# Patient Record
Sex: FEMALE | Race: WHITE
[De-identification: names, ages, dates, MRNs, and addresses within clinical notes are randomized per-mention and may not be internally consistent; named-entity substitution may affect disease eponyms.]

---

## 2017-01-17 ENCOUNTER — HOSPITAL ENCOUNTER (OUTPATIENT)
Dept: HOSPITAL 45 - C.LABSPEC | Age: 67
Discharge: HOME | End: 2017-01-17
Attending: NURSE PRACTITIONER
Payer: COMMERCIAL

## 2017-01-17 DIAGNOSIS — N20.1: Primary | ICD-10-CM

## 2017-01-23 ENCOUNTER — HOSPITAL ENCOUNTER (OUTPATIENT)
Dept: HOSPITAL 45 - C.RAD | Age: 67
Discharge: HOME | End: 2017-01-23
Attending: NURSE PRACTITIONER
Payer: COMMERCIAL

## 2017-01-23 DIAGNOSIS — N20.0: ICD-10-CM

## 2017-01-23 DIAGNOSIS — N20.1: Primary | ICD-10-CM

## 2017-01-23 NOTE — DIAGNOSTIC IMAGING REPORT
KUB



HISTORY:      Right-sided ureteral stone. Follow-up.



COMPARISON: [facility]UB 1/16/2017.



FINDINGS: The bowel gas pattern is unremarkable. There are no dilated loops of

small bowel to suggest an obstruction.  Bilateral renal calculi are again noted.

Dominant stone within the upper pole of the left kidney measures 13 mm. Multiple

round calcifications within the deep pelvis are consistent with phleboliths. The

5 mm stone within the proximal right ureter seen on the prior study now resides

within the distal right ureter adjacent to the lower sacrum. No left ureteral

calculi. No pneumoperitoneum or pneumatosis.



IMPRESSION:  

Interval migration of the 5 mm right ureteral stone which now resides within the

distal right ureter. Bilateral nephrolithiasis persist.







Electronically signed by:  Nilson Seth M.D.

1/23/2017 1:01 PM



Dictated Date/Time:  1/23/2017 1:00 PM

## 2017-01-24 ENCOUNTER — HOSPITAL ENCOUNTER (OUTPATIENT)
Dept: HOSPITAL 45 - C.LAB | Age: 67
Discharge: HOME | End: 2017-01-24
Attending: UROLOGY
Payer: COMMERCIAL

## 2017-01-24 DIAGNOSIS — N20.0: Primary | ICD-10-CM

## 2017-01-24 LAB
ANION GAP SERPL CALC-SCNC: 13 MMOL/L (ref 3–11)
BASOPHILS # BLD: 0.03 K/UL (ref 0–0.2)
BASOPHILS NFR BLD: 0.3 %
BUN SERPL-MCNC: 11 MG/DL (ref 7–18)
BUN/CREAT SERPL: 12.5 (ref 10–20)
CALCIUM SERPL-MCNC: 9 MG/DL (ref 8.5–10.1)
CHLORIDE SERPL-SCNC: 102 MMOL/L (ref 98–107)
CO2 SERPL-SCNC: 28 MMOL/L (ref 21–32)
COMPLETE: YES
CREAT SERPL-MCNC: 0.84 MG/DL (ref 0.6–1.2)
EOSINOPHIL NFR BLD AUTO: 203 K/UL (ref 130–400)
GLUCOSE SERPL-MCNC: 82 MG/DL (ref 70–99)
HCT VFR BLD CALC: 37.9 % (ref 37–47)
IG%: 0.2 %
IMM GRANULOCYTES NFR BLD AUTO: 36.4 %
LYMPHOCYTES # BLD: 3.5 K/UL (ref 1.2–3.4)
MCH RBC QN AUTO: 30.2 PG (ref 25–34)
MCHC RBC AUTO-ENTMCNC: 33.5 G/DL (ref 32–36)
MCV RBC AUTO: 90.2 FL (ref 80–100)
MONOCYTES NFR BLD: 7.6 %
NEUTROPHILS # BLD AUTO: 1.5 %
NEUTROPHILS NFR BLD AUTO: 54 %
PMV BLD AUTO: 11.6 FL (ref 7.4–10.4)
POTASSIUM SERPL-SCNC: 4 MMOL/L (ref 3.5–5.1)
RBC # BLD AUTO: 4.2 M/UL (ref 4.2–5.4)
SODIUM SERPL-SCNC: 143 MMOL/L (ref 136–145)
WBC # BLD AUTO: 9.62 K/UL (ref 4.8–10.8)

## 2017-01-26 ENCOUNTER — HOSPITAL ENCOUNTER (OUTPATIENT)
Dept: HOSPITAL 45 - C.ACU | Age: 67
Discharge: HOME | End: 2017-01-26
Attending: UROLOGY
Payer: COMMERCIAL

## 2017-01-26 VITALS
OXYGEN SATURATION: 97 % | HEART RATE: 68 BPM | DIASTOLIC BLOOD PRESSURE: 68 MMHG | SYSTOLIC BLOOD PRESSURE: 156 MMHG | TEMPERATURE: 98.06 F

## 2017-01-26 VITALS
OXYGEN SATURATION: 98 % | SYSTOLIC BLOOD PRESSURE: 137 MMHG | DIASTOLIC BLOOD PRESSURE: 71 MMHG | HEART RATE: 72 BPM | TEMPERATURE: 97.52 F

## 2017-01-26 VITALS
SYSTOLIC BLOOD PRESSURE: 171 MMHG | OXYGEN SATURATION: 98 % | TEMPERATURE: 98.24 F | HEART RATE: 63 BPM | DIASTOLIC BLOOD PRESSURE: 76 MMHG

## 2017-01-26 VITALS
HEIGHT: 60.98 IN | BODY MASS INDEX: 33.68 KG/M2 | BODY MASS INDEX: 33.68 KG/M2 | BODY MASS INDEX: 33.68 KG/M2 | HEIGHT: 60.98 IN | WEIGHT: 178.38 LBS | WEIGHT: 178.38 LBS

## 2017-01-26 VITALS
SYSTOLIC BLOOD PRESSURE: 128 MMHG | OXYGEN SATURATION: 96 % | HEART RATE: 70 BPM | TEMPERATURE: 97.88 F | DIASTOLIC BLOOD PRESSURE: 69 MMHG

## 2017-01-26 DIAGNOSIS — E11.9: ICD-10-CM

## 2017-01-26 DIAGNOSIS — I65.29: ICD-10-CM

## 2017-01-26 DIAGNOSIS — N39.41: ICD-10-CM

## 2017-01-26 DIAGNOSIS — N02.9: ICD-10-CM

## 2017-01-26 DIAGNOSIS — E78.00: ICD-10-CM

## 2017-01-26 DIAGNOSIS — F41.9: ICD-10-CM

## 2017-01-26 DIAGNOSIS — E55.9: ICD-10-CM

## 2017-01-26 DIAGNOSIS — D12.6: ICD-10-CM

## 2017-01-26 DIAGNOSIS — E03.9: ICD-10-CM

## 2017-01-26 DIAGNOSIS — I10: ICD-10-CM

## 2017-01-26 DIAGNOSIS — M10.9: ICD-10-CM

## 2017-01-26 DIAGNOSIS — N20.1: Primary | ICD-10-CM

## 2017-01-26 DIAGNOSIS — R32: ICD-10-CM

## 2017-01-26 DIAGNOSIS — N39.0: ICD-10-CM

## 2017-01-26 DIAGNOSIS — Z87.448: ICD-10-CM

## 2017-01-26 DIAGNOSIS — M85.80: ICD-10-CM

## 2017-01-26 DIAGNOSIS — E53.8: ICD-10-CM

## 2017-01-26 DIAGNOSIS — R31.9: ICD-10-CM

## 2017-01-26 NOTE — ANESTHESIOLOGY PROGRESS NOTE
Anesthesia Post Op Note


Date & Time


Jan 26, 2017 at 09:38





Vital Signs


Pain Intensity:  2





 Vital Signs Past 12 Hours








  Date Time  Temp Pulse Resp B/P Pulse Ox O2 Delivery O2 Flow Rate FiO2


 


1/26/17 09:34 36.4 72 18 137/71 98 Room Air  


 


1/26/17 09:15 36.6 70 18 128/69 96 Room Air  


 


1/26/17 08:45 36.7 68 18 156/68 97 Room Air  


 


1/26/17 08:40 37.0 64 16 143/69 94 Room Air  





      Mask  


 


1/26/17 08:30 37.0 66 16 142/65 92 Room Air  





      Mask  


 


1/26/17 08:20  71 16 156/83 100 Mask 10 


 


1/26/17 08:10  73 16 147/79 100 Mask 10 


 


1/26/17 08:04 36.2 76 16 179/92 100 Mask 10 


 


1/26/17 05:37 36.8 63 18 171/76 98 Room Air  











Notes


Mental Status:  alert / awake / arousable, participated in evaluation


Pt Amnestic to Procedure:  Yes


Nausea / Vomiting:  adequately controlled


Pain:  adequately controlled


Airway Patency, RR, SpO2:  stable & adequate


BP & HR:  stable & adequate


Hydration State:  stable & adequate


Anesthetic Complications:  no major complications apparent

## 2017-01-26 NOTE — [facility] POST OPERATIVE BRIEF NOTE
Immediate Operative Summary


Operative Date


Jan 26, 2017.





Pre-Operative Diagnosis





Right ureteral stone





Post-Operative Diagnosis





Same as preoperative diagnosis





Procedure(s) Performed





Right Cystoscopy, Ureteroscopy, Laser Lithotripsy; Stent





Surgeon


Dr Gloria





Assistant Surgeon(s)


none





Estimated Blood Loss


0





Findings


distal right ureteral stone





Specimens





A: Ureteral stone for analysis





Drains


6x24 right stent

## 2017-01-26 NOTE — DIAGNOSTIC IMAGING REPORT
INTRAOPERATIVE RADIOGRAPHS



CLINICAL HISTORY: Nephrolithiasis. Laser lithotripsy and right ureteral stone

extraction. Stent placement.



Fluoroscopy time: 30 seconds.



FINDINGS: 8 spot fluoroscopic views of the right abdomen from a lithotripsy

procedure and stent placement are presented. Initial images show a lithotripsy

device. There is no significant right-sided hydronephrosis seen. The final image

shows the proximal end of a right ureteral stent coiled in the renal pelvis.



IMPRESSION: Intraoperative images from a right-sided laser lithotripsy and

ureteral stent procedure as above. See operative report for detailed findings.







Electronically signed by:  Cornelius Burton M.D.

1/26/2017 9:18 AM



Dictated Date/Time:  1/26/2017 9:16 AM

## 2017-01-26 NOTE — DISCHARGE INSTRUCTIONS
Discharge Instructions


Visit


Reason for Visit:  Stones





Discharge


Discharge Diagnosis / Problem:  ureteral stone





Discharge Goals


Goal(s):  Therapeutic intervention





Activity Recommendations


Activity Limitations:  resume your previous activity (take it easy today)





Anesthesia


.





Post Anesthesia Instructions:





If you have had General Anesthesia or IV Sedation:





*  Do not drive today.


*  Resume driving when surgeon permits.


*  Do not make important decisions or sign legal documents today.


*  Call surgeon for:





   1.  Temperature elevations greater than 101 degrees F.


   2.  Uncontrollable pain.


   3.  Excessive bleeding.


   4.  Persistent nausea and vomiting.


   5.  Medication intolerance (nausea, vomiting or rash).





*  For nausea and vomiting use only clear liquids such as: tea, soda, bouillon 

until nausea subsides, then gradually increase diet as tolerated.





*  If you have any concerns or questions, call your surgeon's office.  If 

physician is unavailable and it is an emergency, call 911 or go to the nearest 

emergency room.





.





Diet Recommendations


Recommended Home Diet:  resume previous diet





Procedures


Procedures Performed:  


Right Cystoscopy, Ureteroscopy, Laser Lithotripsy; Stent





Pending Studies


Studies pending at discharge:  no





Medical Emergencies








.


Who to Call and When:





Medical Emergencies:  If at any time you feel your situation is an emergency, 

please call 911 immediately.





.





Non-Emergent Contact


Non-Emergency issues call your:  Urologist





.


.





"Provider Documentation" section prepared by Bharat Gloria.





[place]


Search Results:  patient reviewed within database

## 2017-01-26 NOTE — OPERATIVE REPORT
DATE OF OPERATION:  01/26/2017

 

PREOPERATIVE DIAGNOSIS:  Distal right ureteral calculus.

 

POSTOPERATIVE DIAGNOSIS:  Same.

 

PROCEDURE:  Cystoscopy, right retrograde pyelogram, right ureteroscopy with

laser lithotripsy and basket extraction of fragments, right ureteral

calculus, placement of indwelling double J right ureteral stent 6-Portuguese x 24

cm.

 

FINDINGS:  Cystoscopic exam revealed normal urethra.  Bladder showed no

mucosal abnormalities.  Ureteroscopy showed a stone in the distal ureter with

proximal hydronephrosis on retrograde.

 

SURGEON:  Dr. Gloria.

 

ANESTHESIA:  General.

 

DRAINS:  6-Portuguese x 24 cm right ureteral stent.

 

COMPLICATIONS:  None.

 

SPECIMENS:  Stone for analysis.

 

INDICATIONS:  The patient is a 66-year-old white female who was seen in the

office yesterday with a distal right ureteral stone.  She has had it for a

week or 2 and has been unable to pass it.  She has  been having significant

pain, so she is being brought in now for removal.

 

OPERATION AND FINDINGS: 

 

PROCEDURE:  After the induction of an adequate general anesthetic and

appropriate time-out, the patient was placed in the dorsal lithotomy

position, lower abdomen and genitalia were prepped with Hibiclens and draped

in a sterile fashion.  Using a 22-Portuguese cystoscope, routine cystoscopic exam

was performed with the above noted findings with the 30 and 70 degree lenses.

 Next, under fluoroscopic guidance 0.038 guidewire was passed up the right

ureter until positioned in the renal pelvis confirmed by fluoroscopy.  Then,

the cystoscope was removed and a rigid ureteroscope was advanced up the right

ureter until the stone was encountered.  Holmium laser was then used to

fragment the stone into multiple small pieces.  Basket was then used to

extract the larger pieces.  After completing the procedure, the ureteroscope

was removed.  A [place] catheter was passed over the guidewire and retrograde

pyelogram was done.  The [place] catheter was removed.  The guidewire was

rethreaded through the cystoscope and a 6-Portuguese x 24 cm stent was passed up

the right ureter until positioned in the renal pelvis confirmed by

fluoroscopy.  The guidewire was removed.  There was good curl at the bladder

level.  The patient's bladder was drained, cystoscope and sheath were

removed.  All needle, sponge and instrument counts were correct at the end of

the case.  The patient tolerated the procedure well and went to the recovery

room in stable condition.

 

 

I attest to the content of the Intraoperative Record and any orders documented therein. Any exceptio
ns are noted below.

## 2017-02-06 ENCOUNTER — HOSPITAL ENCOUNTER (OUTPATIENT)
Dept: HOSPITAL 45 - C.RAD | Age: 67
Discharge: HOME | End: 2017-02-06
Attending: UROLOGY
Payer: COMMERCIAL

## 2017-02-06 DIAGNOSIS — N20.0: Primary | ICD-10-CM

## 2017-02-06 NOTE — DIAGNOSTIC IMAGING REPORT
KUB



CLINICAL HISTORY: N20.0 Nephrolithiasis nephrocalcinosis



COMPARISON STUDY:  1/23/2017 



FINDINGS: Interval placement of a right ureteral stent. Bilateral

nephrocalcinosis is unchanged. The distal right ureteral calculus is now well

seen currently. Multiple pelvic vascular calcifications are present.



IMPRESSION:  Interval placement of a right ureteral stent.





2. The distal right ureteral calculus is not seen currently.





3. Stable bilateral nephrocalcinosis 







Electronically signed by:  Alexis Nieto M.D.

2/6/2017 12:29 PM



Dictated Date/Time:  2/6/2017 12:28 PM

## 2017-02-16 NOTE — DIAGNOSTIC IMAGING REPORT
KUB



HISTORY:      N20.0 Nephrolithiasis BE DONE EITHER THE NIGHT BEFORE OR MORNING



COMPARISON: KUB 2/6/2017.



FINDINGS: The bowel gas pattern is unremarkable. There are no dilated loops of

small bowel to suggest an obstruction.  The right ureteral stent is been

removed. Stable bilateral nephrolithiasis. Dominant stone within the upper pole

the left kidney measures 12 mm. No ureteral calculi. Calcifications in the deep

pelvis likely represent phleboliths. No pneumoperitoneum or pneumatosis.



IMPRESSION:  



1. Interval removal of the right ureteral stent. No ureteral calculi identified.

2. Stable bilateral nephrolithiasis.







Electronically signed by:  Nilson Seth M.D.

2/16/2017 11:49 AM



Dictated Date/Time:  2/16/2017 11:47 AM

## 2017-02-17 ENCOUNTER — HOSPITAL ENCOUNTER (OUTPATIENT)
Dept: HOSPITAL 45 - X.SURG | Age: 67
Discharge: HOME | End: 2017-02-17
Attending: UROLOGY
Payer: COMMERCIAL

## 2017-02-17 VITALS
WEIGHT: 178.38 LBS | BODY MASS INDEX: 33.68 KG/M2 | HEIGHT: 60.98 IN | BODY MASS INDEX: 33.68 KG/M2 | WEIGHT: 178.38 LBS | HEIGHT: 60.98 IN

## 2017-02-17 VITALS — OXYGEN SATURATION: 96 % | DIASTOLIC BLOOD PRESSURE: 76 MMHG | HEART RATE: 54 BPM | SYSTOLIC BLOOD PRESSURE: 134 MMHG

## 2017-02-17 DIAGNOSIS — E55.9: ICD-10-CM

## 2017-02-17 DIAGNOSIS — E11.9: ICD-10-CM

## 2017-02-17 DIAGNOSIS — N39.41: ICD-10-CM

## 2017-02-17 DIAGNOSIS — I10: ICD-10-CM

## 2017-02-17 DIAGNOSIS — N20.0: Primary | ICD-10-CM

## 2017-02-17 DIAGNOSIS — E53.8: ICD-10-CM

## 2017-02-17 DIAGNOSIS — M54.5: ICD-10-CM

## 2017-02-17 NOTE — ANESTHESIA PROGRESS NT - [facility]
Anesthesia Post Op Note


Date & Time


Feb 17, 2017 at 11:46





Vital Signs


Pain Intensity:  0





 Vital Signs Past 12 Hours








  Date Time  Temp Pulse Resp B/P Pulse Ox O2 Delivery O2 Flow Rate FiO2


 


2/17/17 11:11 36.4 54 20 146/75 95 Room Air  


 


2/17/17 11:05  55 12     


 


2/17/17 11:05  55 12  95   


 


2/17/17 11:03    131/62    


 


2/17/17 11:00  52 15  96   


 


2/17/17 11:00  51 15     


 


2/17/17 10:59  52 17  96   


 


2/17/17 10:59  52 17     


 


2/17/17 10:58    129/63    


 


2/17/17 10:55 36.6 53 20 143/69 96 Room Air  


 


2/17/17 10:54  53 8  93   


 


2/17/17 10:54  54 8     


 


2/17/17 10:53    143/69    


 


2/17/17 10:50  56 11     


 


2/17/17 10:50  57 11  96   


 


2/17/17 10:49    124/57    


 


2/17/17 10:45  54 15  97   


 


2/17/17 10:45  55 15     


 


2/17/17 10:44  54 12     


 


2/17/17 10:44  54 12  97   


 


2/17/17 10:43    130/67    


 


2/17/17 10:39  58 12  97   


 


2/17/17 10:39  58 12     


 


2/17/17 10:38    134/64    


 


2/17/17 10:34  59 14  98   


 


2/17/17 10:34  59 14     


 


2/17/17 10:33    130/74    


 


2/17/17 10:31  55 12  100   


 


2/17/17 10:31  56 12     


 


2/17/17 10:28    138/77    


 


2/17/17 10:26  58 11     


 


2/17/17 10:26  62 11  99   


 


2/17/17 10:26 37.0 60 22 133/75 99 Mask 8 


 


2/17/17 09:02 36.6 73 16 163/75 97 Room Air  











Notes


Mental Status:  alert / awake / arousable, participated in evaluation


Pt Amnestic to Procedure:  Yes


Nausea / Vomiting:  adequately controlled


Pain:  adequately controlled


Airway Patency, RR, SpO2:  stable & adequate


BP & HR:  stable & adequate


Hydration State:  stable & adequate


Anesthetic Complications:  no major complications apparent

## 2017-02-17 NOTE — [facility] POST OPERATIVE BRIEF NOTE
Immediate Operative Summary


Operative Date


Feb 17, 2017.





Pre-Operative Diagnosis





Right Renal Calculi





Post-Operative Diagnosis





Same





Procedure(s) Performed





Right Renal Extracorporeal Shock Wave Lithotripsy - Renal





Surgeon


Dr. Fu





Assistant Surgeon(s)


None





Estimated Blood Loss


0 mL





Findings


2 r renal stones appeared to fragment





Specimens





None

## 2017-02-17 NOTE — DISCHARGE INSTRUCTIONS-[facility]
Discharge Instructions


Visit


Reason for Visit:  Stones;Nephrolithiasis N20.0





Discharge Goals


Goal(s):  Decrease discomfort, Improve disease control





Medications


Stopped Medications Name(s):  


FISH OIL- STOPPED ON MONDAY 2/13/17





METFORMIN- STOPPED ON SUNDAY 2/12/17





Activity Recommendations


Activity Limitations:  as noted below (no driving on narcotics)





Anesthesia


.





Post Anesthesia Instructions:





If you have had General Anesthesia or IV Sedation:





*  Do not drive today.


*  Resume driving when surgeon permits.


*  Do not make important decisions or sign legal documents today.


*  Call surgeon for:





   1.  Temperature elevations greater than 101 degrees F.


   2.  Uncontrollable pain.


   3.  Excessive bleeding.


   4.  Persistent nausea and vomiting.


   5.  Medication intolerance (nausea, vomiting or rash).





*  For nausea and vomiting use only clear liquids such as: tea, soda, bouillon 

until nausea subsides, then gradually increase diet as tolerated.





*  If you have any concerns or questions, call your surgeon's office.  If 

physician is unavailable and it is an emergency, call 911 or go to the nearest 

emergency room.





.





Diet Recommendations


Home Diet:  resume previous diet





Procedures


Procedures Performed:  


Right Renal Extracorporeal Shock Wave Lithotripsy - Renal





Pending Studies


Studies pending at discharge:  no





Medical Emergencies








.


Who to Call and When:





Medical Emergencies:  If at any time you feel your situation is an emergency, 

please call 911 immediately.





.





Non-Emergent Contact





.


.





"Provider Documentation" section prepared by Javier Fu.

## 2017-03-01 ENCOUNTER — HOSPITAL ENCOUNTER (OUTPATIENT)
Dept: HOSPITAL 45 - C.RAD | Age: 67
Discharge: HOME | End: 2017-03-01
Attending: UROLOGY
Payer: COMMERCIAL

## 2017-03-01 DIAGNOSIS — N20.0: Primary | ICD-10-CM

## 2017-03-01 NOTE — DIAGNOSTIC IMAGING REPORT
KUB



CLINICAL HISTORY: N20.0 Nephrolithiasis    



COMPARISON STUDY:  2/16/2017 



FINDINGS: There is a 14 mm upper pole left renal calculus. Several lower pole

left renal calculi are visualized, the largest of which measures 9 mm. There are

ovoid opacities medial to the upper pole the right kidney. I suspect but am not

certain that these represent pill fragments given their location in relationship

to the kidney. There is a punctate lower pole right renal calculus. There are

multiple nonspecific pelvic basin calcifications likely representing

phleboliths. There is no pathologic bowel dilatation.



IMPRESSION:  Bilateral nephrolithiasis. 







Electronically signed by:  Kleber Alexandre M.D.

3/1/2017 11:31 AM



Dictated Date/Time:  3/1/2017 11:27 AM

## 2017-03-02 ENCOUNTER — HOSPITAL ENCOUNTER (OUTPATIENT)
Dept: HOSPITAL 45 - C.LABSPEC | Age: 67
Discharge: HOME | End: 2017-03-02
Attending: UROLOGY
Payer: COMMERCIAL

## 2017-03-02 DIAGNOSIS — N39.0: Primary | ICD-10-CM

## 2017-04-20 ENCOUNTER — HOSPITAL ENCOUNTER (OUTPATIENT)
Dept: HOSPITAL 45 - C.LAB | Age: 67
Discharge: HOME | End: 2017-04-20
Attending: INTERNAL MEDICINE
Payer: COMMERCIAL

## 2017-04-20 DIAGNOSIS — E11.9: ICD-10-CM

## 2017-04-20 DIAGNOSIS — E83.42: ICD-10-CM

## 2017-04-20 DIAGNOSIS — E78.00: ICD-10-CM

## 2017-04-20 DIAGNOSIS — E03.9: Primary | ICD-10-CM

## 2017-04-20 LAB
ALT SERPL-CCNC: 35 U/L (ref 12–78)
ANION GAP SERPL CALC-SCNC: 5 MMOL/L (ref 3–11)
AST SERPL-CCNC: 18 U/L (ref 15–37)
BUN SERPL-MCNC: 9 MG/DL (ref 7–18)
BUN/CREAT SERPL: 10.3 (ref 10–20)
CALCIUM SERPL-MCNC: 9.5 MG/DL (ref 8.5–10.1)
CHLORIDE SERPL-SCNC: 107 MMOL/L (ref 98–107)
CO2 SERPL-SCNC: 30 MMOL/L (ref 21–32)
CREAT SERPL-MCNC: 0.87 MG/DL (ref 0.6–1.2)
CREAT UR-MCNC: 57 MG/DL
EST. AVERAGE GLUCOSE BLD GHB EST-MCNC: 128 MG/DL
GLUCOSE SERPL-MCNC: 178 MG/DL (ref 70–99)
MAGNESIUM SERPL-MCNC: 2.2 MG/DL (ref 1.8–2.4)
POTASSIUM SERPL-SCNC: 4.8 MMOL/L (ref 3.5–5.1)
RATIO: 121.9 MCG/MG (ref 0–30)
SODIUM SERPL-SCNC: 142 MMOL/L (ref 136–145)
TSH SERPL-ACNC: 1.09 UIU/ML (ref 0.3–4.5)

## 2017-04-25 ENCOUNTER — HOSPITAL ENCOUNTER (OUTPATIENT)
Dept: HOSPITAL 45 - C.ULTR | Age: 67
Discharge: HOME | End: 2017-04-25
Attending: INTERNAL MEDICINE
Payer: COMMERCIAL

## 2017-04-25 DIAGNOSIS — R59.0: Primary | ICD-10-CM

## 2017-04-25 DIAGNOSIS — E04.2: ICD-10-CM

## 2017-04-25 DIAGNOSIS — N02.9: ICD-10-CM

## 2017-04-25 DIAGNOSIS — E78.00: ICD-10-CM

## 2017-04-25 LAB
BASOPHILS # BLD: 0.02 K/UL (ref 0–0.2)
BASOPHILS NFR BLD: 0.3 %
CHOLEST/HDLC SERPL: 2.6 {RATIO}
COMPLETE: YES
EOSINOPHIL NFR BLD AUTO: 253 K/UL (ref 130–400)
GLUCOSE UR QL: 65 MG/DL
HCT VFR BLD CALC: 40 % (ref 37–47)
IG%: 0.1 %
IMM GRANULOCYTES NFR BLD AUTO: 39.1 %
KETONES UR QL STRIP: 73 MG/DL
LYMPHOCYTES # BLD: 2.88 K/UL (ref 1.2–3.4)
MCH RBC QN AUTO: 29.7 PG (ref 25–34)
MCHC RBC AUTO-ENTMCNC: 32.3 G/DL (ref 32–36)
MCV RBC AUTO: 92 FL (ref 80–100)
MONOCYTES NFR BLD: 8.6 %
NEUTROPHILS # BLD AUTO: 2 %
NEUTROPHILS NFR BLD AUTO: 49.9 %
NITRITE UR QL STRIP: 150 MG/DL (ref 0–150)
PH UR: 168 MG/DL (ref 0–200)
PMV BLD AUTO: 11.6 FL (ref 7.4–10.4)
RBC # BLD AUTO: 4.35 M/UL (ref 4.2–5.4)
VERY LOW DENSITY LIPOPROT CALC: 30 MG/DL
WBC # BLD AUTO: 7.36 K/UL (ref 4.8–10.8)

## 2017-04-25 NOTE — DIAGNOSTIC IMAGING REPORT
ULTRASOUND OF THE THYROID GLAND



CLINICAL HISTORY: Cervical lymphadenopathy.



COMPARISON STUDY: No priors.



TECHNIQUE: Real-time, grayscale, and color flow sonography of the thyroid gland

is performed utilizing a high-frequency linear transducer. Images are reviewed

in the transverse and longitudinal planes.



FINDINGS:



Right lobe: The right lobe of the thyroid gland is normal in size and

homogeneous in echotexture, measuring 4.3 x 1.1 x 1.2 cm. A hypoechoic nodule in

the midpole measures 1.6 x 0.4 x 0.6 cm. A hypoechoic nodule in the upper pole

measures 0.4 cm.



Left lobe: The left lobe of the thyroid gland is normal in size and homogeneous

in echotexture, measuring 3.6 x 1.2 x 1.2 cm.



Isthmus: The thyroid isthmus is normal in appearance and measures 0.2 cm in AP

diameter.



Soft tissues: There is a mildly enlarged right cervical lymph node, which

measures 2.1 x 0.7 x 1.4 cm. This node maintains a fatty hilum.





IMPRESSION:  



1. There are small right-sided thyroid nodules as above. Precautionary 6-12

month follow-up examination is recommended for reassessment.



2. There is a mildly enlarged right cervical lymph node. This is of

indeterminant etiology and significance, and this does not appear

morphologically abnormal. This is likely on a reactive basis, and clinical

follow-up to resolution is recommended. If this increases in size then a repeat

examination with fine-needle aspiration should be considered.







Electronically signed by:  Cornelius Burton M.D.

4/25/2017 9:17 AM



Dictated Date/Time:  4/25/2017 9:10 AM

## 2017-05-02 ENCOUNTER — HOSPITAL ENCOUNTER (OUTPATIENT)
Dept: HOSPITAL 45 - C.RAD | Age: 67
Discharge: HOME | End: 2017-05-02
Attending: NURSE PRACTITIONER
Payer: COMMERCIAL

## 2017-05-02 DIAGNOSIS — N20.0: Primary | ICD-10-CM

## 2017-05-02 DIAGNOSIS — R32: ICD-10-CM

## 2017-05-02 NOTE — DIAGNOSTIC IMAGING REPORT
KUB



CLINICAL HISTORY: N20.0 nephrocalcinosis



COMPARISON STUDY:  3/1/2017 



FINDINGS: Unchanging left renal nephrocalcinosis. Calcification appears

described the right paravertebral line are no longer identified. Probable

appendicolith. Multiple pelvic vascular calcifications.



IMPRESSION:  Unchanging left renal nephrocalcinosis.

2. No well-defined right renal calcifications currently 







Electronically signed by:  Alexis Nieto M.D.

5/2/2017 10:58 AM



Dictated Date/Time:  5/2/2017 10:57 AM

## 2017-05-09 ENCOUNTER — HOSPITAL ENCOUNTER (OUTPATIENT)
Dept: HOSPITAL 45 - C.LABSPEC | Age: 67
Discharge: HOME | End: 2017-05-09
Attending: NURSE PRACTITIONER
Payer: COMMERCIAL

## 2017-05-09 DIAGNOSIS — N20.0: Primary | ICD-10-CM

## 2017-05-10 ENCOUNTER — HOSPITAL ENCOUNTER (OUTPATIENT)
Dept: HOSPITAL 45 - C.ACU | Age: 67
Discharge: HOME | End: 2017-05-10
Attending: UROLOGY
Payer: COMMERCIAL

## 2017-05-10 VITALS
SYSTOLIC BLOOD PRESSURE: 148 MMHG | HEART RATE: 60 BPM | TEMPERATURE: 98.42 F | DIASTOLIC BLOOD PRESSURE: 65 MMHG | OXYGEN SATURATION: 96 %

## 2017-05-10 VITALS
TEMPERATURE: 97.88 F | HEART RATE: 71 BPM | OXYGEN SATURATION: 65 % | SYSTOLIC BLOOD PRESSURE: 144 MMHG | DIASTOLIC BLOOD PRESSURE: 61 MMHG

## 2017-05-10 VITALS — DIASTOLIC BLOOD PRESSURE: 62 MMHG | HEART RATE: 60 BPM | SYSTOLIC BLOOD PRESSURE: 161 MMHG | OXYGEN SATURATION: 97 %

## 2017-05-10 VITALS
HEART RATE: 63 BPM | TEMPERATURE: 97.88 F | SYSTOLIC BLOOD PRESSURE: 198 MMHG | OXYGEN SATURATION: 96 % | DIASTOLIC BLOOD PRESSURE: 75 MMHG

## 2017-05-10 VITALS
HEIGHT: 60.98 IN | BODY MASS INDEX: 34.43 KG/M2 | HEIGHT: 60.98 IN | WEIGHT: 182.39 LBS | BODY MASS INDEX: 34.43 KG/M2 | BODY MASS INDEX: 34.43 KG/M2 | WEIGHT: 182.39 LBS

## 2017-05-10 DIAGNOSIS — E11.9: ICD-10-CM

## 2017-05-10 DIAGNOSIS — E66.9: ICD-10-CM

## 2017-05-10 DIAGNOSIS — Z85.820: ICD-10-CM

## 2017-05-10 DIAGNOSIS — F41.9: ICD-10-CM

## 2017-05-10 DIAGNOSIS — F32.9: ICD-10-CM

## 2017-05-10 DIAGNOSIS — Z90.710: ICD-10-CM

## 2017-05-10 DIAGNOSIS — Z98.42: ICD-10-CM

## 2017-05-10 DIAGNOSIS — Z98.41: ICD-10-CM

## 2017-05-10 DIAGNOSIS — N20.0: Primary | ICD-10-CM

## 2017-05-10 DIAGNOSIS — I10: ICD-10-CM

## 2017-05-10 DIAGNOSIS — Z90.89: ICD-10-CM

## 2017-05-10 NOTE — ANESTHESIOLOGY PROGRESS NOTE
Anesthesia Post Op Note


Date & Time


May 10, 2017 at 09:19





Vital Signs


Pain Intensity:  0





 Vital Signs Past 12 Hours








  Date Time  Temp Pulse Resp B/P Pulse Ox O2 Delivery O2 Flow Rate FiO2


 


5/10/17 08:50  60 16 161/62 97 Room Air  


 


5/10/17 08:20 36.9 60 18 148/65 96 Room Air  


 


5/10/17 08:00 37.1 63 21 127/68 94 Room Air  


 


5/10/17 07:50  68 21 129/69 95 Room Air  


 


5/10/17 07:45  64 21 144/70 100 Room Air  


 


5/10/17 07:34 37.2 69 14 120/65 100 Mask 10 


 


5/10/17 05:45 36.6 63 20 198/75 96 Room Air  











Notes


Mental Status:  alert / awake / arousable, participated in evaluation


Pt Amnestic to Procedure:  Yes


Nausea / Vomiting:  adequately controlled


Pain:  adequately controlled


Airway Patency, RR, SpO2:  stable & adequate


BP & HR:  stable & adequate


Hydration State:  stable & adequate


Anesthetic Complications:  no major complications apparent

## 2017-05-10 NOTE — DIAGNOSTIC IMAGING REPORT
FLUOROSCOPIC IMAGES FROM LEFT RETROGRADE EXAM



CLINICAL HISTORY: Left stent placement.    



COMPARISON STUDY:  KUB May 2, 2017.



Fluoroscopy time: 32 seconds.



FINDINGS: 2 fluoroscopic images from left retrograde exam demonstrate

cannulation of the left ureter with placement of a left ureteral stent. The

proximal aspect of the stent is within the renal pelvis. Left renal calculi and

a possible renal pelvis calculus are again noted.



IMPRESSION:  Fluoroscopic images demonstrating placement of a left ureteral

stent. 









Electronically signed by:  Stan Stienberg M.D.

5/10/2017 10:30 AM



Dictated Date/Time:  5/10/2017 10:29 AM

## 2017-05-10 NOTE — [facility] POST OPERATIVE BRIEF NOTE
Immediate Operative Summary


Operative Date


May 10, 2017.





Pre-Operative Diagnosis





Left nephrolithiasis





Post-Operative Diagnosis





Same





Procedure(s) Performed





Cystoscopy, Left Ureteral Stent Insertion, Retrograde pyelogram





Surgeon


Dr ANEUDY Collins





Assistant Surgeon(s)


NA





Estimated Blood Loss


0





Findings


Upper pole stone, good stent position





Specimens





none





Drains


6 fr 24 cm loop stent on left





Anesthesia


MAC





Complication(s)


None





Disposition


Recovery Room / PACU

## 2017-05-10 NOTE — DISCHARGE INSTRUCTIONS
Discharge Instructions


Date of Service


May 10, 2017.





Admission


Reason for Admission:  Stones





Discharge


Discharge Diagnosis / Problem:  L stones s/p stent





Discharge Goals


Goal(s):  Improve disease control, Therapeutic intervention





Activity Recommendations


Activity Limitations:  as noted below


Lifting Limitations:  gradually increase as tolerated


Exercise/Sports Limitations:  rest today, gradually increase as tolerated


May Resume Sexual Activity:  when tolerated


Shower/Bathe:  no limitations


Driving or Machine Use:  resume 1 day after discharge





.





Discharge Diet


Recommended Diet:  Regular Diet (good fluid intake)





Procedures


Procedures Performed:  


Cysto, L RPG, L stent





Pending Studies


Studies pending at discharge:  no





Laboratory Results





 Hemoglobin A1c








Test


  4/20/17


12:03 Range/Units


 


 


Estimated Average Glucose 128   mg/dl


 


Hemoglobin A1c 6.1 H 4.5-5.6  %








 Lipid Panel








Test


  4/25/17


08:40 Range/Units


 


 


Triglycerides Level 150  0-150  mg/dl


 


Cholesterol Level 168  0-200  mg/dl


 


HDL Cholesterol 65   mg/dl


 


Cholesterol/HDL Ratio 2.6   


 


LDL Cholesterol, Calculated 73   mg/dl











Medical Emergencies








.


Who to Call and When:





Medical Emergencies:  If at any time you feel your situation is an emergency, 

please call 911 immediately.





.





Non-Emergent Contact


Non-Emergency issues call your:  Urologist


Call Non-Emergent contact if:  you have a fever, temperature is above 101, your 

pain is not controlled, your pain is worsening, your pain is unusual for you, 

your pain is concerning you, you have any medication questions





.


.





"Provider Documentation" section prepared by Fredo Collins.








.





VTE Core Measure


Inpt VTE Proph given/why not?:  SCD's





[place] Drug [place]


Search Results:  patient reviewed within database, see additional documentation 

(last Rx Feb 2017 by Dr. Fu, regular prior - provided for stent pain and 

upcoming ESWL)

## 2017-05-10 NOTE — OPERATIVE REPORT
PREOPERATIVE DIAGNOSIS:  Left renal stone pending shockwave lithotripsy.

 

POSTOPERATIVE DIAGNOSIS:  Same.

 

PROCEDURE:  Cystoscopy, left retrograde pyelography, left ureteral stent

placement.

 

SURGEON:  Dr. Fredo Collins.

 

COMPLICATIONS:  None.

 

ANESTHESIA:  Monitored anesthesia care with sedation.

 

COMPLICATIONS:  None.

 

ESTIMATED BLOOD LOSS:  Minimal.

 

SPECIMENS SENT TO PATHOLOGY:  None.

 

DRAINS LEFT IN PLACE:  Include a 6-[place] 24 cm loop stent on the left hand

side.

 

FINDINGS:  Left upper quadrant calcification consistent with a stone noted to

be apparently with an upper pole jody on retrograde pyelography.

 

Good stent position on fluoroscopy.

 

BRIEF HISTORY:  Ms. Gordon is a 66-year-old  female who has been seen

by our service as an outpatient for history of stone disease.  She has seen

our  nurse practitioner recently and was found to have a significant left

renal stone on imaging.  After discussion of risks and benefits of various

forms of intervention, the patient has decided upon shockwave lithotripsy to

manage her disease.  However, she requested a stent be placed preoperatively

to assist with post-lithotripsy fragment passage.  Please see outpatient

notes and  H\T\P for further details.  She is here today for this purpose. 

Intravenous ciprofloxacin provided for antibiotic coverage and SCDs for DVT

prophylaxis.  

 

PROCEDURE:  The patient was properly identified and brought to the operative

suite.  After identification and appropriate consent on the chart, monitored

anesthesia care with sedation was initiated and the patient was prepped and

draped in standard fashion for this procedure.  Full time-out procedure was

followed.  Fluoroscopy over the left  upper quadrant demonstrated

calcification consistent with the patient's renal stone.  Gentle retrograde

pyelography was performed without pyelovenous backflow demonstrated

opacification of the normal ureter and decompressed left renal pelvis.  Stone

appeared to be within the upper pole jody.  Intravenous Benadryl was

additionally provided secondary to a history of iodinated contrast allergy,

although the patient had no  reaction to the retrograde.  A sensor tip wire

was placed up to the level of the left renal pelvis followed by a 6-[place] 24

cm loop stent with a good coil at the level of the renal pelvis and redundant

loops present within the bladder.  Bladder was drained, the cystoscope was

removed, anesthesia was reversed.  The patient was transferred to recovery

room in stable condition.

 

FOLLOW-UP CARE:  The patient provided with prescription for Percocet and

Pyridium for postoperative analgesia.  Her last pain medication prescription

was in February of this year.  The patient was provided with low dose

nitrofurantoin at bedtime seeing the presence of  foreign body and  upcoming

surgery.  She is instructed to contact our service should she note any

fevers, chills, nausea, vomiting or other significant difficulties as an

outpatient.  Expected stent symptoms have been discussed with the patient

preoperatively.  Outpatient appointments are confirmed.

 

I attest to the content of the Intraoperative Record and any orders documented 
therein. Any exceptions are noted below.

 

 

CIRILO

## 2017-05-18 ENCOUNTER — HOSPITAL ENCOUNTER (OUTPATIENT)
Dept: HOSPITAL 45 - C.RAD | Age: 67
Discharge: HOME | End: 2017-05-18
Attending: NURSE PRACTITIONER
Payer: COMMERCIAL

## 2017-05-18 DIAGNOSIS — N20.0: Primary | ICD-10-CM

## 2017-05-18 NOTE — DIAGNOSTIC IMAGING REPORT
KUB



HISTORY:      N20.0 Nephrolithiasis



COMPARISON: KUB 5/2/2017.



FINDINGS: The bowel gas pattern is unremarkable. There are no dilated loops of

small bowel to suggest an obstruction.  Multiple pelvic phleboliths remain

unchanged. Interval placement of a left-sided ureteral stent which appears be in

good position. Multiple left renal calculi remain unchanged. Dominant stone

within the upper pole measures 14 mm. There may be a punctate stone within the

lower pole the right kidney. No definite ureteral calculi. No pneumoperitoneum

or pneumatosis.



IMPRESSION:  



1. Left ureteral stent which appears to be in good position.

2. Left-sided nephrolithiasis, unchanged. Possible small stone within the lower

pole of the right kidney.







Electronically signed by:  Nilson Seth M.D.

5/18/2017 2:47 PM



Dictated Date/Time:  5/18/2017 2:45 PM

## 2017-05-19 ENCOUNTER — HOSPITAL ENCOUNTER (OUTPATIENT)
Dept: HOSPITAL 45 - X.SURG | Age: 67
Discharge: HOME | End: 2017-05-19
Attending: UROLOGY
Payer: COMMERCIAL

## 2017-05-19 VITALS
BODY MASS INDEX: 34.43 KG/M2 | HEIGHT: 60.98 IN | HEIGHT: 60.98 IN | BODY MASS INDEX: 34.43 KG/M2 | WEIGHT: 182.39 LBS | WEIGHT: 182.39 LBS

## 2017-05-19 VITALS — OXYGEN SATURATION: 97 % | SYSTOLIC BLOOD PRESSURE: 156 MMHG | DIASTOLIC BLOOD PRESSURE: 81 MMHG | HEART RATE: 68 BPM

## 2017-05-19 VITALS — TEMPERATURE: 98.42 F

## 2017-05-19 DIAGNOSIS — E55.9: ICD-10-CM

## 2017-05-19 DIAGNOSIS — E11.9: ICD-10-CM

## 2017-05-19 DIAGNOSIS — Z83.3: ICD-10-CM

## 2017-05-19 DIAGNOSIS — E78.00: ICD-10-CM

## 2017-05-19 DIAGNOSIS — I10: ICD-10-CM

## 2017-05-19 DIAGNOSIS — N20.0: Primary | ICD-10-CM

## 2017-05-19 DIAGNOSIS — E03.9: ICD-10-CM

## 2017-05-19 DIAGNOSIS — Z82.49: ICD-10-CM

## 2017-05-19 DIAGNOSIS — Z90.49: ICD-10-CM

## 2017-05-19 DIAGNOSIS — E53.8: ICD-10-CM

## 2017-05-19 NOTE — ANESTHESIA PROGRESS NT - [facility]
Anesthesia Post Op Note


Date & Time


May 19, 2017 at 10:31





Vital Signs


Pain Intensity:  0





 Vital Signs Past 12 Hours








  Date Time  Temp Pulse Resp B/P Pulse Ox O2 Delivery O2 Flow Rate FiO2


 


5/19/17 10:19  68 20 156/81 97 Room Air  


 


5/19/17 09:54 36.9 68 20 163/78 97 Room Air  


 


5/19/17 09:48  65 10     


 


5/19/17 09:48  65 10  96   


 


5/19/17 09:46    143/64    


 


5/19/17 09:45 36.5 67 14 143/64 96 Room Air  


 


5/19/17 09:43  68 13     


 


5/19/17 09:43  69 13  96   


 


5/19/17 09:41    142/66    


 


5/19/17 09:38  66 15  100   


 


5/19/17 09:38  66 15     


 


5/19/17 09:36    145/67    


 


5/19/17 09:33  72 11  99   


 


5/19/17 09:33  70 11     


 


5/19/17 09:31    130/61    


 


5/19/17 09:28  70 14     


 


5/19/17 09:28  71 14  95   


 


5/19/17 09:26    123/52    


 


5/19/17 09:23  65 17  99   


 


5/19/17 09:23  65 17     


 


5/19/17 09:21    131/57    


 


5/19/17 09:18  60 13     


 


5/19/17 09:18  66 13  99   


 


5/19/17 09:16    135/64    


 


5/19/17 09:13  67 21  99   


 


5/19/17 09:13  67 21     


 


5/19/17 09:11    133/58    


 


5/19/17 09:08  69 18     


 


5/19/17 09:08  69 18  98   


 


5/19/17 09:06    137/64    


 


5/19/17 09:03  70 20  98   


 


5/19/17 09:03  70 20     


 


5/19/17 09:01    132/61    


 


5/19/17 08:58  69 18     


 


5/19/17 08:58  69 18  98   


 


5/19/17 08:56    132/60    


 


5/19/17 08:54    130/64    


 


5/19/17 08:53 36.7 69 16 132/61 98 Diffusion Mask 6 


 


5/19/17 07:01    148/83    


 


5/19/17 06:35 36.8 77  189/75 99 Room Air  











Notes


Mental Status:  alert / awake / arousable, participated in evaluation


Pt Amnestic to Procedure:  Yes


Nausea / Vomiting:  adequately controlled


Pain:  adequately controlled


Airway Patency, RR, SpO2:  stable & adequate


BP & HR:  stable & adequate


Hydration State:  stable & adequate


Anesthetic Complications:  no major complications apparent

## 2017-05-19 NOTE — [facility] POST OPERATIVE BRIEF NOTE
Immediate Operative Summary


Operative Date


May 19, 2017.





Pre-Operative Diagnosis





Left Renal Calculi





Post-Operative Diagnosis





Same





Procedure(s) Performed





Left Extracorporeal Shock Wave Lithotripsy - Renal





Surgeon


Dr. Fu





Assistant Surgeon(s)


None





Estimated Blood Loss


0 mL





Findings


large left upper renal stone





Specimens





None





Drains


left stent

## 2017-05-19 NOTE — OPERATIVE REPORT
DATE OF OPERATION:  05/19/2017

 

PROCEDURE PERFORMED:  Left ESWL.

 

SURGEON:  Dr. Fu.  

 

INDICATIONS:  The patient is status post stones that have been treated on the

right who presents now for left ESWL 4 months later.  The patient had a

relatively large upper pole left stone and a smaller lower pole stone.  She

had a stent placed because of the size of the upper pole left renal stone

prior to lithotripsy.  Of note, it did appear that on lithotripsy the stone

might be above the jody which could indicate that it was in the caliceal

diverticulum.  No formal studies made this  obvious, but I did take this into

account.

 

DESCRIPTION OF THE PROCEDURE:  The patient was taken to the operating room. 

She had been given Venodyne stockings and placed in the supine position after

general anesthesia was administered.  The stone was localized and she

received 2500 shocks, the majority at level 4.  There was some change in the

shape of the stone, although it did not appear to drop down into the renal

pelvis.  The patient was transferred to the recovery room in stable

condition.

 

 

I attest to the content of the Intraoperative Record and any orders documented therein. Any exceptio
ns are noted below.

## 2017-05-19 NOTE — DISCHARGE INSTRUCTIONS-[facility]
Discharge Instructions


Date of Service


May 19, 2017.





Visit


Reason for Visit:  Stones





Discharge


Discharge Diagnosis / Problem:  l renal stones





Discharge Goals


Goal(s):  Decrease discomfort, Improve disease control





Medications


Stopped Medications Name(s):  


Metformin and Fish Oil stopped Wednesday





Activity Recommendations


Activity Limitations:  per Instructions/Follow-up section (no driving on 

narcotics)





Anesthesia


.





Post Anesthesia Instructions:





If you have had General Anesthesia or IV Sedation:





*  Do not drive today.


*  Resume driving when surgeon permits.


*  Do not make important decisions or sign legal documents today.


*  Call surgeon for:





   1.  Temperature elevations greater than 101 degrees F.


   2.  Uncontrollable pain.


   3.  Excessive bleeding.


   4.  Persistent nausea and vomiting.


   5.  Medication intolerance (nausea, vomiting or rash).





*  For nausea and vomiting use only clear liquids such as: tea, soda, bouillon 

until nausea subsides, then gradually increase diet as tolerated.





*  If you have any concerns or questions, call your surgeon's office.  If 

physician is unavailable and it is an emergency, call 911 or go to the nearest 

emergency room.





.





Diet Recommendations


Home Diet:  resume previous diet (drink lots of fluids)





Procedures


Procedures Performed:  


Left Extracorporeal Shock Wave Lithotripsy - Renal





Pending Studies


Studies pending at discharge:  no





Medical Emergencies








.


Who to Call and When:





Medical Emergencies:  If at any time you feel your situation is an emergency, 

please call 911 immediately.





.





Non-Emergent Contact


Non-Emergency issues call your:  Urologist





.


.





"Provider Documentation" section prepared by Javier uF.








.

## 2017-05-30 ENCOUNTER — HOSPITAL ENCOUNTER (OUTPATIENT)
Dept: HOSPITAL 45 - C.RAD | Age: 67
Discharge: HOME | End: 2017-05-30
Attending: NURSE PRACTITIONER
Payer: COMMERCIAL

## 2017-05-30 DIAGNOSIS — E11.9: ICD-10-CM

## 2017-05-30 DIAGNOSIS — Z83.3: ICD-10-CM

## 2017-05-30 DIAGNOSIS — Z82.49: ICD-10-CM

## 2017-05-30 DIAGNOSIS — Z90.710: ICD-10-CM

## 2017-05-30 DIAGNOSIS — Z90.722: ICD-10-CM

## 2017-05-30 DIAGNOSIS — E55.9: ICD-10-CM

## 2017-05-30 DIAGNOSIS — E78.00: ICD-10-CM

## 2017-05-30 DIAGNOSIS — E03.9: ICD-10-CM

## 2017-05-30 DIAGNOSIS — E53.8: ICD-10-CM

## 2017-05-30 DIAGNOSIS — I10: ICD-10-CM

## 2017-05-30 DIAGNOSIS — Z90.49: ICD-10-CM

## 2017-05-30 DIAGNOSIS — Z96.0: ICD-10-CM

## 2017-05-30 DIAGNOSIS — N20.0: Primary | ICD-10-CM

## 2017-05-30 LAB
ANION GAP SERPL CALC-SCNC: 7 MMOL/L (ref 3–11)
BASOPHILS # BLD: 0.04 K/UL (ref 0–0.2)
BASOPHILS NFR BLD: 0.5 %
BUN SERPL-MCNC: 8 MG/DL (ref 7–18)
BUN/CREAT SERPL: 9.7 (ref 10–20)
CHLORIDE SERPL-SCNC: 107 MMOL/L (ref 98–107)
CO2 SERPL-SCNC: 28 MMOL/L (ref 21–32)
COMPLETE: YES
CREAT SERPL-MCNC: 0.83 MG/DL (ref 0.6–1.2)
EOSINOPHIL NFR BLD AUTO: 253 K/UL (ref 130–400)
HCT VFR BLD CALC: 35.1 % (ref 37–47)
IG%: 0.1 %
IMM GRANULOCYTES NFR BLD AUTO: 39.9 %
LYMPHOCYTES # BLD: 3.31 K/UL (ref 1.2–3.4)
MCH RBC QN AUTO: 29.5 PG (ref 25–34)
MCHC RBC AUTO-ENTMCNC: 32.5 G/DL (ref 32–36)
MCV RBC AUTO: 90.7 FL (ref 80–100)
MONOCYTES NFR BLD: 7 %
NEUTROPHILS # BLD AUTO: 3.6 %
NEUTROPHILS NFR BLD AUTO: 48.9 %
PMV BLD AUTO: 11.5 FL (ref 7.4–10.4)
POTASSIUM SERPL-SCNC: 4 MMOL/L (ref 3.5–5.1)
RBC # BLD AUTO: 3.87 M/UL (ref 4.2–5.4)
SODIUM SERPL-SCNC: 142 MMOL/L (ref 136–145)
WBC # BLD AUTO: 8.3 K/UL (ref 4.8–10.8)

## 2017-05-30 NOTE — DIAGNOSTIC IMAGING REPORT
KUB



CLINICAL HISTORY: N20.0 [id number]    



COMPARISON STUDY:  5/18/2017 



FINDINGS: There is a left-sided nephroureteral stent. There are punctate lower

pole right renal calculi. There are multiple left renal calculi, the largest of

which projects of the lower pole measuring 8 mm. At least 5 proximal left

ureteral calculi are visualized paralleling the proximal stent. The previously

identified calculus projected over the left renal pelvis has apparently been

fragmented



IMPRESSION:  

1. Bilateral nephrolithiasis, with decreasing left intrarenal stone burden

2. Proximal left ureteral Steinstrasse

3. No change the position of the left-sided nephroureteral stent 







Electronically signed by:  Kleber Alexandre M.D.

5/30/2017 10:18 AM



Dictated Date/Time:  5/30/2017 10:16 AM

## 2017-06-01 ENCOUNTER — HOSPITAL ENCOUNTER (OUTPATIENT)
Dept: HOSPITAL 45 - C.RAD | Age: 67
Discharge: HOME | End: 2017-06-01
Attending: UROLOGY
Payer: COMMERCIAL

## 2017-06-01 DIAGNOSIS — N20.0: Primary | ICD-10-CM

## 2017-06-01 DIAGNOSIS — Z96.0: ICD-10-CM

## 2017-06-01 NOTE — DIAGNOSTIC IMAGING REPORT
KUB



CLINICAL HISTORY: N20.0 Nephrolithiasis    



COMPARISON STUDY:  5/30/2017 



FINDINGS: There is no pathologic bowel dilatation. There are tiny right renal

calculi. There are multiple left renal calculi, the largest of which measures 8

mm. There is a left-sided nephroureteral stent. There is persistent proximal

left ureteral Steinstrasse. The largest calculus measures 7 mm.



IMPRESSION:  

1. Bilateral nephrolithiasis

2. Persistent proximal left ureteral Steinstrasse

3. No change in the position of the left-sided nephroureteral stent 







Electronically signed by:  Kleber Alexandre M.D.

6/1/2017 4:36 PM



Dictated Date/Time:  6/1/2017 4:32 PM

## 2017-06-02 ENCOUNTER — HOSPITAL ENCOUNTER (OUTPATIENT)
Dept: HOSPITAL 45 - X.SURG | Age: 67
Discharge: HOME | End: 2017-06-02
Attending: UROLOGY
Payer: COMMERCIAL

## 2017-06-02 VITALS
HEIGHT: 60.98 IN | HEIGHT: 60.98 IN | BODY MASS INDEX: 34.43 KG/M2 | BODY MASS INDEX: 34.43 KG/M2 | WEIGHT: 182.39 LBS | WEIGHT: 182.39 LBS

## 2017-06-02 VITALS — HEART RATE: 58 BPM | DIASTOLIC BLOOD PRESSURE: 88 MMHG | OXYGEN SATURATION: 98 % | SYSTOLIC BLOOD PRESSURE: 150 MMHG

## 2017-06-02 VITALS — TEMPERATURE: 97.34 F

## 2017-06-02 DIAGNOSIS — E53.8: ICD-10-CM

## 2017-06-02 DIAGNOSIS — Z90.710: ICD-10-CM

## 2017-06-02 DIAGNOSIS — E11.9: ICD-10-CM

## 2017-06-02 DIAGNOSIS — Z90.722: ICD-10-CM

## 2017-06-02 DIAGNOSIS — I10: ICD-10-CM

## 2017-06-02 DIAGNOSIS — E78.00: ICD-10-CM

## 2017-06-02 DIAGNOSIS — Z83.3: ICD-10-CM

## 2017-06-02 DIAGNOSIS — E03.9: ICD-10-CM

## 2017-06-02 DIAGNOSIS — E55.9: ICD-10-CM

## 2017-06-02 DIAGNOSIS — Z90.49: ICD-10-CM

## 2017-06-02 DIAGNOSIS — Z82.49: ICD-10-CM

## 2017-06-02 DIAGNOSIS — N20.2: Primary | ICD-10-CM

## 2017-06-02 NOTE — [facility] POST OPERATIVE BRIEF NOTE
Immediate Operative Summary


Operative Date


Jun 2, 2017.





Pre-Operative Diagnosis





Left ureteral and renal stones





Post-Operative Diagnosis





Same





Procedure(s) Performed





Left Extracorporeal Shock Wave Lithotripsy, Repeat--ureteral





Surgeon


Dr ANEUDY Collins





Assistant Surgeon(s)


0





Estimated Blood Loss


0





Findings


Excellent fragmentation of ureteral and renal stones





Specimens





0





Drains


Indwelling stent present





Anesthesia


GALMA





Complication(s)


None





Disposition


Recovery Room / PACU

## 2017-06-02 NOTE — ANESTHESIA PROGRESS NT - [facility]
Anesthesia Post Op Note


Date & Time


Jun 2, 2017 at 11:12





Vital Signs


Pain Intensity:  0





Vital Signs Past 12 Hours








  Date Time  Temp Pulse Resp B/P (MAP) Pulse Ox O2 Delivery O2 Flow Rate FiO2


 


6/2/17 11:04 36.9 54 16 143/63 99 Room Air  


 


6/2/17 11:03  58 15     


 


6/2/17 11:03  57 15  98   


 


6/2/17 11:02    143/63    


 


6/2/17 10:58  54 13  97   


 


6/2/17 10:58  55 13     


 


6/2/17 10:57    145/64    


 


6/2/17 10:54  54 13  98   


 


6/2/17 10:54  54 13     


 


6/2/17 10:52    145/65    


 


6/2/17 10:49  54 9  100   


 


6/2/17 10:49  53 9     


 


6/2/17 10:47    138/71    


 


6/2/17 10:44  55 17  100   


 


6/2/17 10:44  53 17     


 


6/2/17 10:42    144/64    


 


6/2/17 10:39  55 13     


 


6/2/17 10:39  58 13  100   


 


6/2/17 10:37    135/71    


 


6/2/17 10:35    150/77    


 


6/2/17 10:34 36.6 61 16 150/77 99 Diffusion Mask 6 


 


6/2/17 08:13 36.8 61 18 163/78 (106) 99 Room Air  











Notes


Mental Status:  alert / awake / arousable, participated in evaluation


Pt Amnestic to Procedure:  Yes


Nausea / Vomiting:  adequately controlled


Pain:  adequately controlled


Airway Patency, RR, SpO2:  stable & adequate


BP & HR:  stable & adequate


Hydration State:  stable & adequate


Anesthetic Complications:  no major complications apparent

## 2017-06-02 NOTE — OPERATIVE REPORT
DATE OF OPERATION:  06/02/2017

 

PREOPERATIVE DIAGNOSIS:  Left ureteral and renal stones with indwelling

stent.

 

POSTOPERATIVE DIAGNOSIS:  Same.

 

PROCEDURE:  Extracorporeal shockwave lithotripsy of left ureteral and renal

stones.

 

SURGEON:  Dr. Fredo Collins.

 

ASSISTANT:  None.

 

ANESTHESIA:  General anesthesia with laryngeal mask.

 

COMPLICATIONS:  None.

 

FINDINGS:  Ureteral stones fragmented with excellent fragmentation on

fluoroscopic exam.  Intraoperatively, larger and lower most renal stone

targeted with good fragmentation.  The smaller mid pole stone not treated. 

Good stent position on fluoroscopy.

 

SPECIMENS SENT TO PATHOLOGY:  None.

 

ESTIMATED BLOOD LOSS:  Minimal.

 

COMPLICATIONS:  None.

 

ANESTHESIA:  General anesthesia with laryngeal mask.

 

DETAILS OF PROCEDURE:  The patient was brought to the litho suite. He was

correctly identified and the stone was visualized on his most recent x-rays. 

After the correct time out was performed the patient was positioned over the

therapy head. An adequate level of anesthesia was administered. The

extracorporeal shockwave lithotripsy treatment was then commenced. Please see

the [employer] [facility] sheet for complete treatment summary.

After completion of the procedure the patient was taken to the recovery room

in stable condition.

 

 

I attest to the content of the Intraoperative Record and any orders documented therein. Any exception
s are noted below.

## 2017-06-02 NOTE — DISCHARGE INSTRUCTIONS
Discharge Instructions


Date of Service


Jun 2, 2017.





Admission


Reason for Admission:  Stones





Discharge


Discharge Diagnosis / Problem:  L ureteral stones s/p ESWL





Discharge Goals


Goal(s):  Decrease discomfort, Improve disease control, Therapeutic intervention





Activity Recommendations


Activity Limitations:  per Instructions/Follow-up section


Lifting Limitations:  no more than 25 pounds, gradually increase as tolerated (

x 3-5 days)


Exercise/Sports Limitations:  rest today, gradually increase as tolerated (x 3-

5 days)


May Resume Sexual Activity:  when tolerated


Shower/Bathe:  no limitations


Driving or Machine Use:  resume 1 day after discharge





.





Instructions / Follow-Up


Instructions / Follow-Up


Strain all urine as instructed





KUB Xray before follow-up visit in office as scheduled





Discharge Diet


Recommended Diet:  Regular Diet (good fluid intake)





Procedures


Procedures Performed:  


Left Extracorporeal Shock Wave Lithotripsy, Repeat--ureteral





Pending Studies


Studies pending at discharge:  no





Laboratory Results





Hemoglobin A1c








Test


  4/20/17


12:03 Range/Units


 


 


Estimated Average Glucose 128   mg/dl


 


Hemoglobin A1c 6.1 H 4.5-5.6  %








Lipid Panel








Test


  4/25/17


08:40 Range/Units


 


 


Triglycerides Level 150  0-150  mg/dl


 


Cholesterol Level 168  0-200  mg/dl


 


HDL Cholesterol 65   mg/dl


 


Cholesterol/HDL Ratio 2.6   


 


LDL Cholesterol, Calculated 73   mg/dl











Medical Emergencies








.


Who to Call and When:





Medical Emergencies:  If at any time you feel your situation is an emergency, 

please call 911 immediately.





.





Non-Emergent Contact


Non-Emergency issues call your:  Urologist


Call Non-Emergent contact if:  you have a fever, temperature is above 101, your 

pain is not controlled, your pain is worsening, your pain is unusual for you, 

your pain is concerning you, you have any medication questions





.


.








"Provider Documentation" section prepared by Fredo Collins.








.





VTE Core Measure


Inpt VTE Proph given/why not?:  SCD's





[place] Drug Monitoring Program


Search Results:  patient reviewed within database, see additional documentation 

(patient has received #110 narcotic pills this year, requests more having taken 

recent Rx by Dr. Fu for postop pain per her report - provided with new Rx)

## 2017-06-12 ENCOUNTER — HOSPITAL ENCOUNTER (OUTPATIENT)
Dept: HOSPITAL 45 - C.RAD | Age: 67
Discharge: HOME | End: 2017-06-12
Attending: UROLOGY
Payer: COMMERCIAL

## 2017-06-12 DIAGNOSIS — N20.0: Primary | ICD-10-CM

## 2017-06-12 NOTE — DIAGNOSTIC IMAGING REPORT
KUB



CLINICAL HISTORY: Nephrolithiasis. Ureteral calculi.    



COMPARISON STUDY:  6/1/2017 



FINDINGS: There is been no change the position of the left-sided nephroureteral

stent. There are faint bilateral renal calcifications, consistent with

nephrolithiasis. The calcifications on the left appear less numerous than on the

prior study. The previously identified proximal left ureteral calculi are no

longer visualized. There are few tiny calcifications along the course of the

distal stent, consistent with small distal left ureteral calculi.



IMPRESSION:  

1. Bilateral nephrolithiasis

2. Interval decrease in the number of left renal calculi

3. The previously identified proximal left ureteral calculi are no longer

visualized. There are few small distal left ureteral calculi along the course of

the stent

4. No change in the position of the left-sided neck ureteral stent 







Electronically signed by:  Kleber Alexandre M.D.

6/12/2017 12:47 PM



Dictated Date/Time:  6/12/2017 12:45 PM

## 2017-06-13 ENCOUNTER — HOSPITAL ENCOUNTER (OUTPATIENT)
Dept: HOSPITAL 45 - C.LABSPEC | Age: 67
Discharge: HOME | End: 2017-06-13
Attending: UROLOGY
Payer: COMMERCIAL

## 2017-06-13 DIAGNOSIS — N20.0: Primary | ICD-10-CM

## 2017-07-13 ENCOUNTER — HOSPITAL ENCOUNTER (OUTPATIENT)
Dept: HOSPITAL 45 - C.LAB1850 | Age: 67
Discharge: HOME | End: 2017-07-13
Attending: PHYSICIAN ASSISTANT
Payer: COMMERCIAL

## 2017-07-13 DIAGNOSIS — R51: Primary | ICD-10-CM

## 2017-07-13 LAB
BASOPHILS # BLD: 0.03 K/UL (ref 0–0.2)
BASOPHILS NFR BLD: 0.4 %
COMPLETE: YES
EOSINOPHIL NFR BLD AUTO: 237 K/UL (ref 130–400)
HCT VFR BLD CALC: 37 % (ref 37–47)
IG%: 0.3 %
IMM GRANULOCYTES NFR BLD AUTO: 40.3 %
LYMPHOCYTES # BLD: 2.81 K/UL (ref 1.2–3.4)
MCH RBC QN AUTO: 27.6 PG (ref 25–34)
MCHC RBC AUTO-ENTMCNC: 31.1 G/DL (ref 32–36)
MCV RBC AUTO: 88.9 FL (ref 80–100)
MONOCYTES NFR BLD: 10.3 %
NEUTROPHILS # BLD AUTO: 1.7 %
NEUTROPHILS NFR BLD AUTO: 47 %
PMV BLD AUTO: 11.4 FL (ref 7.4–10.4)
RBC # BLD AUTO: 4.16 M/UL (ref 4.2–5.4)
WBC # BLD AUTO: 6.97 K/UL (ref 4.8–10.8)

## 2017-07-14 ENCOUNTER — HOSPITAL ENCOUNTER (OUTPATIENT)
Dept: HOSPITAL 45 - C.MAMM | Age: 67
Discharge: HOME | End: 2017-07-14
Attending: INTERNAL MEDICINE
Payer: COMMERCIAL

## 2017-07-14 DIAGNOSIS — Z12.31: Primary | ICD-10-CM

## 2017-07-14 NOTE — MAMMOGRAPHY REPORT
BILATERAL DIGITAL SCREENING MAMMOGRAM WITH CAD: 7/14/2017

CLINICAL HISTORY: Routine screening.  





TECHNIQUE:  Current study was also evaluated with a Computer Aided Detection (CAD) system.  Bilateral
 CC and MLO views were obtained.



COMPARISON: Comparison is made to exams dated:  7/12/2016 mammogram, 7/14/2015 mammogram, 6/19/2014 m
ammogram, 6/13/2013 mammogram, 6/12/2012 mammogram, and 6/10/2011 mammogram - [facility]
enter.   



BREAST COMPOSITION:  The tissue of both breasts is almost entirely fatty.  



FINDINGS:  No suspicious masses, calcifications, or areas of architectural distortion are noted in ei
ther breast. There has been no significant interval change compared to prior exams. Scattered bilater
al benign-appearing calcifications are not significantly changed.  





IMPRESSION:  ACR BI-RADS CATEGORY 2: BENIGN

There is no mammographic evidence of malignancy. A 1 year screening mammogram is recommended.  The pa
tient will receive written notification of the results.  





Approximately 10% of breast cancers are not detected with mammography. A negative mammographic report
 should not delay biopsy if a clinically suggestive mass is present.



Karla Baig M.D.          

/:7/14/2017 13:54:19  



Imaging Technologist: Radha RIVAS([email or web address])(M), [facility]

letter sent: Normal 1/2  

BI-RADS Code: ACR BI-RADS Category 2: Benign

## 2017-08-04 ENCOUNTER — HOSPITAL ENCOUNTER (OUTPATIENT)
Dept: HOSPITAL 45 - C.LAB | Age: 67
Discharge: HOME | End: 2017-08-04
Attending: PHYSICIAN ASSISTANT
Payer: COMMERCIAL

## 2017-08-04 DIAGNOSIS — H54.7: ICD-10-CM

## 2017-08-04 DIAGNOSIS — R51: Primary | ICD-10-CM

## 2017-08-04 LAB
ALBUMIN/GLOB SERPL: 1.1 {RATIO} (ref 0.9–2)
ALP SERPL-CCNC: 72 U/L (ref 45–117)
ALT SERPL-CCNC: 33 U/L (ref 12–78)
ANION GAP SERPL CALC-SCNC: 7 MMOL/L (ref 3–11)
AST SERPL-CCNC: 21 U/L (ref 15–37)
BUN SERPL-MCNC: 12 MG/DL (ref 7–18)
BUN/CREAT SERPL: 13.4 (ref 10–20)
CALCIUM SERPL-MCNC: 9.3 MG/DL (ref 8.5–10.1)
CHLORIDE SERPL-SCNC: 106 MMOL/L (ref 98–107)
CO2 SERPL-SCNC: 26 MMOL/L (ref 21–32)
CREAT SERPL-MCNC: 0.87 MG/DL (ref 0.6–1.2)
GLOBULIN SER-MCNC: 3.3 GM/DL (ref 2.5–4)
GLUCOSE SERPL-MCNC: 163 MG/DL (ref 70–99)
POTASSIUM SERPL-SCNC: 4.1 MMOL/L (ref 3.5–5.1)
SODIUM SERPL-SCNC: 139 MMOL/L (ref 136–145)

## 2017-08-07 ENCOUNTER — HOSPITAL ENCOUNTER (OUTPATIENT)
Dept: HOSPITAL 45 - C.MRI | Age: 67
Discharge: HOME | End: 2017-08-07
Attending: PHYSICIAN ASSISTANT
Payer: COMMERCIAL

## 2017-08-07 DIAGNOSIS — H54.7: Primary | ICD-10-CM

## 2017-08-07 DIAGNOSIS — R51: ICD-10-CM

## 2017-08-07 NOTE — DIAGNOSTIC IMAGING REPORT
Brain MRI WITH AND WITHOUT CONTRAST



HISTORY:      R51 Temporal [id number] Vision [id number]



TECHNIQUE: Multiplanar multisequence MRI of the brain was performed both before

and after the intravenous administration of contrast.



COMPARISON STUDY:  Brain MRI 8/20/2016.



FINDINGS: There are no areas of restricted diffusion to suggest acute

infarction. The midline structures are intact. The mastoid air cells are clear.

The ventricles and sulci are within normal limits for age. There is no mass,

hematoma, midline shift. The major vascular flow-voids at the skull base are

well maintained. Postcontrast sequences show no areas of abnormal enhancement.

There are few scattered punctate foci of T2 hyperintensity seen within the

periventricular white matter of the supratentorial brain. This is slightly

progressed compared the prior study. Trace fluid level within the right sphenoid

sinus.



IMPRESSION:  



1. No acute intracranial abnormality.

2. A few scattered punctate foci of T2 hyperintensity within the periventricular

white matter of the supratentorial brain. These have slightly progressed

compared to the prior study and are nonspecific but favor minimal microvascular

ischemic change given the patient's age. 







Electronically signed by:  Nilson Seth M.D.

8/7/2017 5:10 PM



Dictated Date/Time:  8/7/2017 5:04 PM

## 2017-08-13 ENCOUNTER — HOSPITAL ENCOUNTER (OUTPATIENT)
Dept: HOSPITAL 45 - C.EDB | Age: 67
Setting detail: OBSERVATION
LOS: 3 days | Discharge: HOME | End: 2017-08-16
Attending: FAMILY MEDICINE | Admitting: STUDENT IN AN ORGANIZED HEALTH CARE EDUCATION/TRAINING PROGRAM
Payer: COMMERCIAL

## 2017-08-13 VITALS
HEART RATE: 57 BPM | TEMPERATURE: 98.06 F | DIASTOLIC BLOOD PRESSURE: 78 MMHG | SYSTOLIC BLOOD PRESSURE: 145 MMHG | OXYGEN SATURATION: 97 %

## 2017-08-13 VITALS
DIASTOLIC BLOOD PRESSURE: 77 MMHG | OXYGEN SATURATION: 95 % | SYSTOLIC BLOOD PRESSURE: 152 MMHG | HEART RATE: 60 BPM | TEMPERATURE: 98.24 F

## 2017-08-13 VITALS
OXYGEN SATURATION: 97 % | HEART RATE: 59 BPM | DIASTOLIC BLOOD PRESSURE: 78 MMHG | TEMPERATURE: 98.6 F | SYSTOLIC BLOOD PRESSURE: 151 MMHG

## 2017-08-13 VITALS
DIASTOLIC BLOOD PRESSURE: 85 MMHG | SYSTOLIC BLOOD PRESSURE: 155 MMHG | TEMPERATURE: 97.88 F | OXYGEN SATURATION: 98 % | HEART RATE: 67 BPM

## 2017-08-13 VITALS
HEIGHT: 61 IN | HEIGHT: 61 IN | BODY MASS INDEX: 36.84 KG/M2 | WEIGHT: 195.11 LBS | WEIGHT: 195.11 LBS | BODY MASS INDEX: 36.84 KG/M2

## 2017-08-13 VITALS — OXYGEN SATURATION: 97 %

## 2017-08-13 DIAGNOSIS — F41.9: ICD-10-CM

## 2017-08-13 DIAGNOSIS — K81.1: Primary | ICD-10-CM

## 2017-08-13 DIAGNOSIS — E78.5: ICD-10-CM

## 2017-08-13 DIAGNOSIS — Z79.899: ICD-10-CM

## 2017-08-13 DIAGNOSIS — Z79.82: ICD-10-CM

## 2017-08-13 DIAGNOSIS — F32.9: ICD-10-CM

## 2017-08-13 DIAGNOSIS — Z79.4: ICD-10-CM

## 2017-08-13 DIAGNOSIS — E03.9: ICD-10-CM

## 2017-08-13 DIAGNOSIS — E11.40: ICD-10-CM

## 2017-08-13 LAB
ALP SERPL-CCNC: 104 U/L (ref 45–117)
ALT SERPL-CCNC: 42 U/L (ref 12–78)
ANION GAP SERPL CALC-SCNC: 9 MMOL/L (ref 3–11)
APPEARANCE UR: CLEAR
AST SERPL-CCNC: (no result) U/L (ref 15–37)
BASOPHILS # BLD: 0.04 K/UL (ref 0–0.2)
BASOPHILS NFR BLD: 0.4 %
BILIRUB UR-MCNC: (no result) MG/DL
BUN SERPL-MCNC: 13 MG/DL (ref 7–18)
BUN/CREAT SERPL: 14.4 (ref 10–20)
CALCIUM SERPL-MCNC: 8.7 MG/DL (ref 8.5–10.1)
CHLORIDE SERPL-SCNC: 105 MMOL/L (ref 98–107)
CO2 SERPL-SCNC: 26 MMOL/L (ref 21–32)
COLOR UR: YELLOW
COMPLETE: YES
CREAT CL PREDICTED SERPL C-G-VRATE: 63.5 ML/MIN
CREAT SERPL-MCNC: 0.87 MG/DL (ref 0.6–1.2)
EOSINOPHIL NFR BLD AUTO: 222 K/UL (ref 130–400)
GLUCOSE SERPL-MCNC: 120 MG/DL (ref 70–99)
HCT VFR BLD CALC: 35.4 % (ref 37–47)
IG%: 0.3 %
IMM GRANULOCYTES NFR BLD AUTO: 42 %
LYMPHOCYTES # BLD: 4.32 K/UL (ref 1.2–3.4)
MANUAL MICROSCOPIC REQUIRED?: NO
MCH RBC QN AUTO: 29.1 PG (ref 25–34)
MCHC RBC AUTO-ENTMCNC: 32.8 G/DL (ref 32–36)
MCV RBC AUTO: 88.7 FL (ref 80–100)
MONOCYTES NFR BLD: 10.2 %
NEUTROPHILS # BLD AUTO: 1.3 %
NEUTROPHILS NFR BLD AUTO: 45.8 %
NITRITE UR QL STRIP: (no result)
PH UR STRIP: 7.5 [PH] (ref 4.5–7.5)
PMV BLD AUTO: 11.9 FL (ref 7.4–10.4)
POTASSIUM SERPL-SCNC: (no result) MMOL/L (ref 3.5–5.1)
POTASSIUM SERPL-SCNC: (no result) MMOL/L (ref 3.5–5.1)
POTASSIUM SERPL-SCNC: 4 MMOL/L (ref 3.5–5.1)
RBC # BLD AUTO: 3.99 M/UL (ref 4.2–5.4)
REVIEW REQ?: NO
SODIUM SERPL-SCNC: 140 MMOL/L (ref 136–145)
SP GR UR STRIP: 1.01 (ref 1–1.03)
URINE BILL WITH OR WITHOUT MIC: (no result)
URINE EPITHELIAL CELL AUTO: (no result) /LPF (ref 0–5)
UROBILINOGEN UR-MCNC: (no result) MG/DL
WBC # BLD AUTO: 10.29 K/UL (ref 4.8–10.8)
ZZUR CULT IF INDIC CLEAN CATCH: NO

## 2017-08-13 RX ADMIN — ALLOPURINOL SCH MG: 100 TABLET ORAL at 14:22

## 2017-08-13 RX ADMIN — INSULIN ASPART SCH UNITS: 100 INJECTION, SOLUTION INTRAVENOUS; SUBCUTANEOUS at 17:49

## 2017-08-13 RX ADMIN — ATORVASTATIN CALCIUM SCH MG: 10 TABLET, FILM COATED ORAL at 14:21

## 2017-08-13 RX ADMIN — LEVOTHYROXINE SODIUM SCH MCG: 75 TABLET ORAL at 06:14

## 2017-08-13 RX ADMIN — PAROXETINE SCH MG: 20 TABLET, FILM COATED ORAL at 14:22

## 2017-08-13 RX ADMIN — GABAPENTIN SCH MG: 300 CAPSULE ORAL at 14:21

## 2017-08-13 RX ADMIN — SODIUM CHLORIDE AND POTASSIUM CHLORIDE SCH MLS/HR: 9; 1.49 INJECTION, SOLUTION INTRAVENOUS at 07:14

## 2017-08-13 RX ADMIN — POTASSIUM CITRATE SCH MEQ: 10 TABLET ORAL at 11:05

## 2017-08-13 RX ADMIN — POTASSIUM CITRATE SCH MEQ: 10 TABLET ORAL at 20:48

## 2017-08-13 RX ADMIN — INSULIN ASPART SCH UNITS: 100 INJECTION, SOLUTION INTRAVENOUS; SUBCUTANEOUS at 20:49

## 2017-08-13 RX ADMIN — SODIUM CHLORIDE AND POTASSIUM CHLORIDE SCH MLS/HR: 9; 1.49 INJECTION, SOLUTION INTRAVENOUS at 22:06

## 2017-08-13 RX ADMIN — INSULIN ASPART SCH UNITS: 100 INJECTION, SOLUTION INTRAVENOUS; SUBCUTANEOUS at 12:37

## 2017-08-13 RX ADMIN — GABAPENTIN SCH MG: 300 CAPSULE ORAL at 11:04

## 2017-08-13 RX ADMIN — SODIUM CHLORIDE AND POTASSIUM CHLORIDE SCH MLS/HR: 9; 1.49 INJECTION, SOLUTION INTRAVENOUS at 14:21

## 2017-08-13 RX ADMIN — GABAPENTIN SCH MG: 300 CAPSULE ORAL at 20:48

## 2017-08-13 RX ADMIN — AMILORIDE HYDROCLORIDE SCH MG: 5 TABLET ORAL at 14:23

## 2017-08-13 NOTE — DIAGNOSTIC IMAGING REPORT
NUCLEAR MEDICINE HEPATOBILIARY SCAN 



HISTORY:      RUQ pain ?Biliary Akinesia?



COMPARISON:  Abdominal ultrasound 8/13/2017.



TECHNIQUE: Immediately following the intravenous administration of 5.5 mCi

Tc-99m Choletec, dynamic anterior abdominal imaging was performed.

 

FINDINGS:

Uniform hepatic tracer accumulation is shown. Prompt intrahepatic biliary

excretion is seen. The gallbladder, common bile duct, and small bowel are all

visualized by 35 minutes. This appearance represents the normal sequence of

biliary excretion.



IMPRESSION:  

1. No evidence for cystic duct obstruction.









Electronically signed by:  Nilson Seth M.D.

8/13/2017 2:13 PM



Dictated Date/Time:  8/13/2017 2:12 PM

## 2017-08-13 NOTE — FAMILY MEDICINE PROGRESS NOTE
Progress Note


Date of Service


Aug 13, 2017.





Subjective


Pt evaluation today including:  conversation w/ patient, physical exam, lab 

review, review of studies


Pain:  RUQ pain


Voiding:  no voiding problems


Patient was seen at the bedside. Continues to complain of RUQ pain, which has 

decrease in intensity since yesterday. It radiates to her back. She usually 

have kidney stones pain and she thinks this pain is different. Complains of 

nausea but denies vomiting.





   Constitutional:  No fever


   Respiratory:  No cough, No shortness of breath


   Cardiovascular:  No chest pain


   Abdomen:  + pain (RUQ pain), + nausea, No vomiting, No diarrhea, No GI 

bleeding


   Musculoskeletal:  No muscle pain


   Female :  No dysuria


   Skin:  No rash





Medications





Current Inpatient Medications








 Medications


  (Trade)  Dose


 Ordered  Sig/Khang


 Route  Start Time


 Stop Time Status Last Admin


Dose Admin


 


 Acetaminophen


  (Tylenol Tab)  650 mg  Q4H  PRN


 PO  8/13/17 05:30


 9/12/17 05:29   


 


 


 Al Hydrox/Mg


 Hydrox/Simethicone


  (Maalox Max Susp)  15 ml  Q4H  PRN


 PO  8/13/17 05:30


 9/12/17 05:29   


 


 


 Magnesium


 Hydroxide


  (Milk Of


 Magnesia Susp)  30 ml  Q6H  PRN


 PO  8/13/17 05:30


 9/12/17 05:29   


 


 


 Polyethylene


  (Miralax Powder


 Packet)  17 gm  DAILY  PRN


 PO  8/13/17 05:30


 9/12/17 05:29   


 


 


 Ondansetron HCl


  (Zofran Inj)  4 mg  Q6H  PRN


 IV  8/13/17 05:30


 9/12/17 05:29  8/13/17 06:16


4 MG


 


 Potassium


 Chloride/Sodium


 Chloride  1,000 ml @ 


 125 mls/hr  Q8H


 IV  8/13/17 06:30


 9/12/17 06:29  8/13/17 14:21


125 MLS/HR


 


 Allopurinol


  (Zyloprim Tab)  100 mg  QAM


 PO  8/13/17 09:00


 9/12/17 08:59  8/13/17 14:22


100 MG


 


 Alprazolam


  (Xanax Tab)  1 mg  BID  PRN


 PO  8/13/17 05:30


 9/12/17 05:29   


 


 


 Atorvastatin


 Calcium


  (Lipitor Tab)  10 mg  DAILY


 PO  8/13/17 09:00


 9/12/17 08:59  8/13/17 14:21


10 MG


 


 Gabapentin


  (Neurontin Cap)  300 mg  TID


 PO  8/13/17 09:00


 9/12/17 08:59  8/13/17 14:21


300 MG


 


 Levothyroxine


 Sodium


  (Synthroid Tab)  75 mcg  DAILYBB


 PO  8/13/17 06:14


 9/12/17 06:59   


 


 


 Paroxetine HCl


  (pAXil TAB)  20 mg  QAM


 PO  8/13/17 09:00


 9/12/17 08:59  8/13/17 14:22


20 MG


 


 Potassium Citrate


  (Urocit-K Tab)  10 meq  BID


 PO  8/13/17 09:00


 9/12/17 08:59   


 


 


 Amiloride HCl


  (Amiloride HCl)  5 mg  QAM


 PO  8/13/17 09:00


 9/12/17 08:59  8/13/17 14:23


5 MG


 


 Miscellaneous


  (Iv Fluids


 Completed)  1 ea  PRN  PRN


 N/A  8/13/17 06:00


 8/13/18 05:59   


 


 


 Glucose


  (Glucose 40% Gel)  15-30


 GRAMS 15


 GRAMS...  UD  PRN


 PO  8/13/17 06:30


 9/12/17 06:29   


 


 


 Glucose


  (Glucose Chew


 Tab)  4-8


 Tablets 4


 Tabl...  UD  PRN


 PO  8/13/17 06:30


 9/12/17 06:29   


 


 


 Dextrose


  (Dextrose 50%


 50ML Syringe)  25-50ML OF


 50% DW IV


 FOR...  UD  PRN


 IV  8/13/17 06:30


 9/12/17 06:29   


 


 


 Glucagon


  (Glucagon Inj)  1 mg  UD  PRN


 SQ  8/13/17 06:30


 9/12/17 06:29   


 


 


 Insulin Aspart


  (novoLOG ASPART)  **SLIDING


 SCALE**


 **G...  Q6


 SC  8/13/17 12:00


 9/12/17 11:59   


 











Objective


Vital Signs











  Date Time  Temp Pulse Resp B/P (MAP) Pulse Ox O2 Delivery O2 Flow Rate FiO2


 


8/13/17 15:15 37.0 59 17 151/78 (102) 97 Room Air  


 


8/13/17 07:35      Room Air  


 


8/13/17 07:24 36.7 57 19 145/78 (100) 97 Room Air  


 


8/13/17 06:21 36.6 67 18 155/85 98 Room Air  


 


8/13/17 05:19  66 14 171/85 95 Room Air  


 


8/13/17 04:41  59 22  95   


 


8/13/17 04:31    196/108    


 


8/13/17 04:16  66      


 


8/13/17 04:11  63 19  96   


 


8/13/17 04:01    179/106    


 


8/13/17 03:41  65 14  94   


 


8/13/17 03:36  65 14  95   


 


8/13/17 03:31    185/93    


 


8/13/17 03:06  69 18  97   


 


8/13/17 03:01    168/85    


 


8/13/17 02:36  66 12  95   


 


8/13/17 02:31    172/86    


 


8/13/17 02:20  71 15  95   


 


8/13/17 02:01    153/64    


 


8/13/17 01:50  76      


 


8/13/17 01:45  78 18     


 


8/13/17 01:15  72 13  97   


 


8/13/17 01:01    163/88    


 


8/13/17 00:45  85 21  99   


 


8/13/17 00:43     100 Room Air  


 


8/13/17 00:43  68      


 


8/13/17 00:42  72 22 171/89 99 Room Air  


 


8/13/17 00:25 36.7 79 18 164/83 99 Room Air  











Physical Exam


General Appearance:  WD/WN, no apparent distress, + obese


Neck:  supple, trachea midline


Respiratory/Chest:  chest non-tender, lungs clear, normal breath sounds, no 

respiratory distress, no accessory muscle use


Cardiovascular:  regular rate, rhythm, no edema


Abdomen:  normal bowel sounds, soft, + tenderness (RUQ tender on palpation)


Extremities:  non-tender, no pedal edema, no calf tenderness


Neurologic/Psychiatric:  alert, normal mood/affect


Skin:  normal color, warm/dry, no rash





Laboratory Results





Results Past 24 Hours








Test


  8/13/17


01:13 8/13/17


01:55 8/13/17


02:06 8/13/17


02:43 Range/Units


 


 


White Blood Count 10.29    4.8-10.8  K/uL


 


Red Blood Count 3.99    4.2-5.4  M/uL


 


Hemoglobin 11.6    12.0-16.0  g/dL


 


Hematocrit 35.4    37-47  %


 


Mean Corpuscular Volume 88.7      fL


 


Mean Corpuscular Hemoglobin 29.1    25-34  pg


 


Mean Corpuscular Hemoglobin


Concent 32.8


  


  


  


  32-36  g/dl


 


 


Platelet Count 222    130-400  K/uL


 


Mean Platelet Volume 11.9    7.4-10.4  fL


 


Neutrophils (%) (Auto) 45.8     %


 


Lymphocytes (%) (Auto) 42.0     %


 


Monocytes (%) (Auto) 10.2     %


 


Eosinophils (%) (Auto) 1.3     %


 


Basophils (%) (Auto) 0.4     %


 


Neutrophils # (Auto) 4.72    1.4-6.5  K/uL


 


Lymphocytes # (Auto) 4.32    1.2-3.4  K/uL


 


Monocytes # (Auto) 1.05    0.11-0.59  K/uL


 


Eosinophils # (Auto) 0.13    0-0.5  K/uL


 


Basophils # (Auto) 0.04    0-0.2  K/uL


 


RDW Standard Deviation 46.6    36.4-46.3  fL


 


RDW Coefficient of Variation 14.3    11.5-14.5  %


 


Immature Granulocyte % (Auto) 0.3     %


 


Immature Granulocyte # (Auto) 0.03    0.00-0.02  K/uL


 


Sodium Level 140    136-145  mmol/L


 


Potassium Level    4.0 3.5-5.1  mmol/L


 


Chloride Level 105      mmol/L


 


Carbon Dioxide Level 26    21-32  mmol/L


 


Anion Gap 9.0    3-11  mmol/L


 


Blood Urea Nitrogen 13    7-18  mg/dl


 


Creatinine


  0.87


  


  


  


  0.60-1.20


mg/dl


 


Est Creatinine Clear Calc


Drug Dose 63.5


  


  


  


   ml/min


 


 


Estimated GFR (


American) 79.9


  


  


  


   


 


 


Estimated GFR (Non-


American 68.9


  


  


  


   


 


 


BUN/Creatinine Ratio 14.4    10-20  


 


Random Glucose 120    70-99  mg/dl


 


Lactic Acid Level 3.1    0.4-2.0  mmol/L


 


Calcium Level 8.7    8.5-10.1  mg/dl


 


Total Bilirubin 0.4    0.2-1  mg/dl


 


Direct Bilirubin     0-0.2  mg/dl


 


Aspartate Amino Transf


(AST/SGOT) 


  


  


  


  15-37  U/L


 


 


Alanine Aminotransferase


(ALT/SGPT) 42


  


  


  


  12-78  U/L


 


 


Alkaline Phosphatase 104      U/L


 


Total Protein 6.8    6.4-8.2  gm/dl


 


Albumin 3.5    3.4-5.0  gm/dl


 


Lipase 286      U/L


 


Urine Color  YELLOW    


 


Urine Appearance  CLEAR   CLEAR  


 


Urine pH  7.5   4.5-7.5  


 


Urine Specific Gravity  1.010   1.000-1.030  


 


Urine Protein  NEG   NEG  


 


Urine Glucose (UA)  NEG   NEG  


 


Urine Ketones  NEG   NEG  


 


Urine Occult Blood  NEG   NEG  


 


Urine Nitrite  NEG   NEG  


 


Urine Bilirubin  NEG   NEG  


 


Urine Urobilinogen  NEG   NEG  


 


Urine Leukocyte Esterase  SMALL   NEG  


 


Urine WBC (Auto)  5-10   0-5  /hpf


 


Urine RBC (Auto)  0-4   0-4  /hpf


 


Urine Hyaline Casts (Auto)  0   0-5  /lpf


 


Urine Epithelial Cells (Auto)  0-5   0-5  /lpf


 


Urine Bacteria (Auto)  NEG   NEG  


 


Chemistry Specimen Hemolysis      


 


Test


  8/13/17


03:10 8/13/17


06:59 8/13/17


12:07 


  Range/Units


 


 


Lactic Acid Level 2.8    0.4-2.0  mmol/L


 


Bedside Glucose  109 113  70-90  mg/dl








Microbiology Results


8/13/17 Blood Culture, Received


          Pending


8/13/17 Blood Culture, Received


          Pending





Assessment and Plan


This is a 65 y/o female with hx of nephrolithiasis presents to the hospital 

complaining of RUQ pain for past 3 wks which got worse for past couple of days. 

Initially impression is that this may be related to history of urinary tract 

calculi.  However, review of CT demonstrates stones only in the kidneys, which 

does not cause pain.  There are no stones in the ureters.





* Right upper quadrant pain


    - The etiology most likely gallbladder, given the pain got worse after 

having fatty food. 


    - CT of abdomen showed b/l nephrolithiasis, no ureteral stones, 

hydronephrosis or bowel wall thickening or obstruction. 


    - US showed small amount of gallbladder sludge, no gallstone or gallbladder 

wall thickening. Hepatomegaly demonstrating fatty change.


    - HIDA scan : No evidence for cystic duct obstruction 


    - No Leukocytosis; Normal LFTs


    - Surgery consulted - recommends urology evaluation for stones 


    - IV Fluids NSS + 20 KCl @  125 ml/hr


    - BCx - pending


    - Start diet given no surgery is planned for tomorrow





* Depression/Anxiety


    - Continue Paroxetine


    - Continue Alprazolam





* Type 2 Diabetes Mellitus


    - Insulin SSI


    - q6h Accuchecks while fasting; AC/HS when eating





* Hypothyroidism


   - Continue Levothyroxine





* Diabetic Neuropathy


  - Continue Gabapentin





* DVT prophylaxis


  - SCD


  - MARCIAL


  - Continue to hold pharmacological treatment given possible surgery





Code Status


  - Level I Full Code





Reviewed:  Pt Seen/Exam by Me


History


denies any complains today


Constitutional:  denies: fever


Respiratory:  negative: short of breath


Cardiovascular:  denies chest pain


General Appearance:  no apparent distress


Respiratory:  lungs clear, no respiratory distress


Cardiovascular:  regular rate, rhythm


Gastrointestinal:  normal bowel sounds, non tender, soft


Neurologic/Psychiatric:  alert, oriented x 3


Skin Characteristics:  warm/dry


Assessment/Plan


Resident Physician Supervision Note:


I was present with Dr. Blancas in bedside. I verified the key history and physical

, reviewed labs and image studies, discussed the case with the resident and 

agree with the findings and care plan.

## 2017-08-13 NOTE — HISTORY AND PHYSICAL
History & Physical


Date & Time of Service:


Aug 13, 2017 at 05:19


Chief Complaint:


Pain In Abd Area


Primary Care Physician:


Asaf Muse M.D.


History of Present Illness


Source:  patient


He patient is a 66 year old female who presents with acute on chronic abdominal 

pain.





Patient states that she has had intermittent abdominal pain for the past 3 

weeks.  She localizes the pain to right upper quadrant.  The pain is described 

as stabbling or "like lightning", 8/10 in severity and occasionally radiating 

to the back.  She notes one episode where the pain got worse with a hamburger 

but generally cannot time the pain with food or with movement.  The pain is 

associated with nausea without vomiting





She denies any fevers, chills or sweats.


She denies dysuria, urinary frequency.  She recently had right renal stent 

placement but she denies stent pain.





She states that she has in general had a poor appetite.  





She decided to come in tonight because the pain was more persistent than it 

usually is.  She also states that she has been busy for the past 3 weeks and 

did not have the time to come to the ED to be further evaluated.





Past Medical/Surgical History


Medical Problems:


(1) Diabetes mellitus


Status: Chronic  





(2) Hyperlipidemia


Status: Chronic  





(3) Hypothyroidism


Status: Chronic  





(4) Kidney stone


Status: Chronic  





Depression





Family History





Cancer


Diabetes mellitus


Heart disease


Hypertension


Kidney stones





Social History


Smoking Status:  Never Smoker


Smokeless Tobacco Use:  No


Alcohol Use:  none


Drug Use:  none


Marital Status:  


Housing status:  lives with family


Occupational Status:  retired





Immunizations


History of Influenza Vaccine:  No


Influenza Vaccine Date:  Nov 5, 2012


History of Tetanus Vaccine?:  Yes


Tetanus Immunization Date:  Dec 5, 2002


History of Pneumococcal:  No


History of Hepatitis B Vaccine:  No





Multi-Drug Resistant Organisms


History of MDRO:  No





Allergies


Coded Allergies:  


     Iodinated Diagnostic Agents (Verified  Allergy, Severe, HIVES AND THROAT 

SWELLING, 8/13/17)





Home Medications


Scheduled


Allopurinol (Zyloprim), 100 MG PO QAM


Amiloride Hcl (Amiloride Hcl), 5 MG PO QAM


Aspirin (Aspirin Ec), 81 MG PO DAILY


Atorvastatin (Lipitor), 10 MG PO DAILY


Cyanocobalamin (B-12), 1,000 MCG PO QAM


Gabapentin (Gabapentin), 300 MG PO TID


Levothyroxine Sodium (Levothyroxine Sodium), 75 MCG PO QAM


Magnesium Oxide (Mg Supplement (Magnesium), 400 MG PO NOON


Metformin HCl (Metformin HCl), 500 MG PO TID


Omega-3 Fatty Acids (Fish Oil 1200 mg), 1,200 MG PO TID


Paroxetine (Paxil), 20 MG PO QAM


Potassium Citrate (Alkalinizer (Potassium Citrate ER), 2 TAB PO BID





Scheduled PRN


Alprazolam (Xanax), 1 MG PO BID PRN for Anxiety





Review of Systems


A 10 point review of systems was negative unless stated above.





Physical Exam


Vital Signs











  Date Time  Temp Pulse Resp B/P (MAP) Pulse Ox O2 Delivery O2 Flow Rate FiO2


 


8/13/17 04:41  59 22  95   


 


8/13/17 04:31    196/108    


 


8/13/17 04:16  66      


 


8/13/17 04:11  63 19  96   


 


8/13/17 04:01    179/106    


 


8/13/17 03:41  65 14  94   


 


8/13/17 03:36  65 14  95   


 


8/13/17 03:31    185/93    


 


8/13/17 03:06  69 18  97   


 


8/13/17 03:01    168/85    


 


8/13/17 02:36  66 12  95   


 


8/13/17 02:31    172/86    


 


8/13/17 02:20  71 15  95   


 


8/13/17 02:01    153/64    


 


8/13/17 01:50  76      


 


8/13/17 01:45  78 18     


 


8/13/17 01:15  72 13  97   


 


8/13/17 01:01    163/88    


 


8/13/17 00:45  85 21  99   


 


8/13/17 00:43     100 Room Air  


 


8/13/17 00:43  68      


 


8/13/17 00:42  72 22 171/89 99 Room Air  


 


8/13/17 00:25 36.7 79 18 164/83 99 Room Air  








General Appearance:  WD/WN, no apparent distress, + obese


Head:  normocephalic, atraumatic


Eyes:  normal inspection, EOMI


ENT:  hearing grossly normal, pharynx normal


Neck:  supple, no adenopathy, no JVD


Respiratory/Chest:  lungs clear, no respiratory distress, no accessory muscle 

use


Cardiovascular:  regular rate, rhythm, no gallop, no murmur


Abdomen/GI:  normal bowel sounds, soft, + guarding (RUQ; too obese to determine 

if I am getting under the rib; patient guard with RUQ palpation)


Back:  no CVA tenderness, no muscle spasm


Extremities/Musculoskelatal:  no calf tenderness, no pedal edema


Neurologic/Psych:  alert, normal mood/affect, oriented x 3


Skin:  normal color, warm/dry, no rash





Diagnostics


Laboratory Results





Results Past 24 Hours








Test


  8/13/17


01:13 8/13/17


01:55 8/13/17


02:06 8/13/17


02:43 Range/Units


 


 


White Blood Count 10.29    4.8-10.8  K/uL


 


Red Blood Count 3.99    4.2-5.4  M/uL


 


Hemoglobin 11.6    12.0-16.0  g/dL


 


Hematocrit 35.4    37-47  %


 


Mean Corpuscular Volume 88.7      fL


 


Mean Corpuscular Hemoglobin 29.1    25-34  pg


 


Mean Corpuscular Hemoglobin


Concent 32.8


  


  


  


  32-36  g/dl


 


 


Platelet Count 222    130-400  K/uL


 


Mean Platelet Volume 11.9    7.4-10.4  fL


 


Neutrophils (%) (Auto) 45.8     %


 


Lymphocytes (%) (Auto) 42.0     %


 


Monocytes (%) (Auto) 10.2     %


 


Eosinophils (%) (Auto) 1.3     %


 


Basophils (%) (Auto) 0.4     %


 


Neutrophils # (Auto) 4.72    1.4-6.5  K/uL


 


Lymphocytes # (Auto) 4.32    1.2-3.4  K/uL


 


Monocytes # (Auto) 1.05    0.11-0.59  K/uL


 


Eosinophils # (Auto) 0.13    0-0.5  K/uL


 


Basophils # (Auto) 0.04    0-0.2  K/uL


 


RDW Standard Deviation 46.6    36.4-46.3  fL


 


RDW Coefficient of Variation 14.3    11.5-14.5  %


 


Immature Granulocyte % (Auto) 0.3     %


 


Immature Granulocyte # (Auto) 0.03    0.00-0.02  K/uL


 


Sodium Level 140    136-145  mmol/L


 


Potassium Level    4.0 3.5-5.1  mmol/L


 


Chloride Level 105      mmol/L


 


Carbon Dioxide Level 26    21-32  mmol/L


 


Anion Gap 9.0    3-11  mmol/L


 


Blood Urea Nitrogen 13    7-18  mg/dl


 


Creatinine


  0.87


  


  


  


  0.60-1.20


mg/dl


 


Est Creatinine Clear Calc


Drug Dose 63.5


  


  


  


   ml/min


 


 


Estimated GFR (


American) 79.9


  


  


  


   


 


 


Estimated GFR (Non-


American 68.9


  


  


  


   


 


 


BUN/Creatinine Ratio 14.4    10-20  


 


Random Glucose 120    70-99  mg/dl


 


Lactic Acid Level 3.1    0.4-2.0  mmol/L


 


Calcium Level 8.7    8.5-10.1  mg/dl


 


Total Bilirubin 0.4    0.2-1  mg/dl


 


Direct Bilirubin     0-0.2  mg/dl


 


Aspartate Amino Transf


(AST/SGOT) 


  


  


  


  15-37  U/L


 


 


Alanine Aminotransferase


(ALT/SGPT) 42


  


  


  


  12-78  U/L


 


 


Alkaline Phosphatase 104      U/L


 


Total Protein 6.8    6.4-8.2  gm/dl


 


Albumin 3.5    3.4-5.0  gm/dl


 


Lipase 286      U/L


 


Urine Color  YELLOW    


 


Urine Appearance  CLEAR   CLEAR  


 


Urine pH  7.5   4.5-7.5  


 


Urine Specific Gravity  1.010   1.000-1.030  


 


Urine Protein  NEG   NEG  


 


Urine Glucose (UA)  NEG   NEG  


 


Urine Ketones  NEG   NEG  


 


Urine Occult Blood  NEG   NEG  


 


Urine Nitrite  NEG   NEG  


 


Urine Bilirubin  NEG   NEG  


 


Urine Urobilinogen  NEG   NEG  


 


Urine Leukocyte Esterase  SMALL   NEG  


 


Urine WBC (Auto)  5-10   0-5  /hpf


 


Urine RBC (Auto)  0-4   0-4  /hpf


 


Urine Hyaline Casts (Auto)  0   0-5  /lpf


 


Urine Epithelial Cells (Auto)  0-5   0-5  /lpf


 


Urine Bacteria (Auto)  NEG   NEG  


 


Chemistry Specimen Hemolysis      


 


Test


  8/13/17


03:10 


  


  


  Range/Units


 


 


Lactic Acid Level 2.8    0.4-2.0  mmol/L








Microbiology Results


8/13/17 Blood Culture, Received


          Pending


8/13/17 Blood Culture, Received


          Pending





Impression


Assessment and Plan


66 year old female who presents with RUQ pain.





Initially impression is that this may be related to history of urinary tract 

calculi.  However, review of CT demonstrates stones only in the kidneys, which 

does not cause pain.  There are no stones in the ureters.





The patient may have either cholecystitis or biliary akinesia.  Given RUQ 

guarding with palpation on examination, she should be evaluated by surgeon to 

determine if cholecystectomy is required at this time.





Our plan for her is as follow:





RUQ Pain


- No Leukocytosis; Normal LFTs


- CT scan does not suggest cholecystitis; follow-up U/S given clinical picture


- HIDA scan ordered





- Consult general surgery


- NPO


- IV Fluids NSS + 20 KCl @  125 ml/hr





Depression/Anxiety


- Continue Paroxetine


- Continue Alprazolam





Type 2 Diabetes Mellitus


- Insulin SSI


- q6h Accuchecks while fasting; AC/HS when eating





Hypothyroidism


- Continue Levothyroxine





Diabetic Neuropathy


- Continue Gabapentin





DVT prophylaxis


- SCD


- MARCIAL


- Hold pharmacological means until evaluated by surgery





Code Status


- Level I Full Code





Disposition


- Med/Surg








Attending Addendum:





I have physically seen and examined this patient, have supervised the medical 

residents activities, and agree with the H&P as noted above with the following 

exceptions as noted.





The patient denies chest pain, palpitations, shortness of breath, cough, lower 

extremity swelling, vision change, hearing change, sore throat, fevers, chills, 

sweats, nausea, vomiting, blood in urine or stool, dysuria, urinary frequency 

or urgency, lightheadedness, dizziness, headache, memory loss, rash, abnormal 

bruising or bleeding, imbalance, focal or generalized weakness, numbness or 

tingling in arms or legs, generalized arthralgias or myalgias, back or neck pain

, night sweats, or allergy symptoms.


The review of systems is otherwise negative other than for that already noted 

above, and at least 10 systems have been reviewed.








The patient is awake, well-developed and adequately nourished, alert and 

oriented 3, normocephalic and atraumatic, lying in bed and in no acute 

distress.





HEENT--PERRL, EOMI, mucous membranes  and oropharynx dry.


Neck--supple, no JVD or bruits, thyroid normal, trachea midline, no adenopathy.


Heart--normal S1 and S2, no extra beats, no murmurs, rubs or gallops.


Lungs--few coarse breath sounds bilaterally, no respiratory distress, no 

accessory muscle use.


Abdomen--normal bowel sounds and soft, tender right upper quadrant and 

laterally around back toward flank, Nondistended, no hernias or masses,  no 

organomegaly.


Extremities--no cyanosis, clubbing or edema. There are good distal pulses b/l.


Dermatologic--normal skin turgor, normal color, warm and dry, no abnormal lymph 

nodes, no rash.


Neurologic--cranial nerves II through XII grossly intact, motor and sensory 

examination normal.


Rheumatologic--normal range of motion, nontender, muscles and joints.


Psychiatric--normal affect.





Assessment and Plan:





1.  Right upper quadrant pain--patient be admitted to the medical surgical 

floor.


Imaging studies and laboratories as point do not suggest acute cholecystitis, 

however, her examination and history are suggestive.


Order ultrasound right upper quadrant abdomen, and HIDA scan with gallbladder 

ejection fraction.


Nothing by mouth status.


NSS with KCl 20 mEq at 125 ML's per hour.


Cipro 40 mg IV given ED, will place on Zosyn 3.375 mg IV every 8 hours.





2.  Diabetes mellitus--place on Accu-Cheks before meals and at bedtime or 4 

times a day with NovoLog coverage.








Level of Care


Med/Surg





Advanced Directives


Existing Advance Directive:  No


Existing Living Will:  No


Existing Power of :  No





Resuscitation Status


FULL RESUSCITATION





VTE Prophylaxis


VTE Risk Assessment Done? Y/N:  Yes


Risk Level:  Moderate


Given or contraindicated:  SCD's, Contraindicated (hold in case patient for 

surgery)





Social Service Consult


None Apply

## 2017-08-13 NOTE — DIAGNOSTIC IMAGING REPORT
CHEST ONE VIEW PORTABLE



HISTORY:      cp/upper abdominal pain



COMPARISON: Chest 8/20/2016.



FINDINGS: The lungs are clear. Cardiac silhouette is normal in size. No pleural

effusions. No pneumothorax.



IMPRESSION:

No acute process.







Electronically signed by:  Nilson Seth M.D.

8/13/2017 8:11 AM



Dictated Date/Time:  8/13/2017 8:10 AM

## 2017-08-13 NOTE — EMERGENCY ROOM VISIT NOTE
History


Report prepared by Madina:  Winifred Chan


Under the Supervision of:  Dr. Rafa Modi M.D.


First contact with patient:  00:33


Chief Complaint:  ABDOMINAL PAIN


Stated Complaint:  PAIN IN ABD AREA


Nursing Triage Summary:  


c/o right sided abd pain. ongoing for 2-3 weeks, pain worsening tonight and 


radiating across abd. Associated nausea and diarrhea, "no sooner do I eat and 

it 


flushes right out". Pt also reports has been having pain in her head, blurred 


vision in right eye, pt had MRI on Monday. told there was a slight change, but 


nothing significant. Taking low dose ASA. To f/u with Neurology later this month





History of Present Illness


The patient is a 66 year old female who presents to the Emergency Room with 

complaints of constant abdominal pain for three weeks. The patient states she 

came to the ED tonight because it seemed to become worse. She notes that it 

started to radiate across her abdomen. She describes the pain as a bloating and 

currently rates it as an 8/10 in severity. She notes that she last ate six 

hours ago. The patient complains of diarrhea and notes she recently had 

hematuria in setting of recent multiple lithotripsy procedures for her 

bilateral kidney stones. She denies melena. While she notes a history of kidney 

stones, she feels this does not feel the same. She reports that she is supposed 

to have a kidney test next month and that she just started taking Aspirin 

yesterday.





   Source of History:  patient


   Onset:  three weeks ago


   Position:  abdomen


   Symptom Intensity:  8/10


   Quality:  other (bloating)


   Timing:  constant


   Associated Symptoms:  + diarrhea, + urinary symptoms, No melena





Review of Systems


See HPI for pertinent positives and negatives.  A total of ten systems were 

reviewed and were otherwise negative.





Past Medical & Surgical


Medical Problems:


(1) Diabetes mellitus


(2) Diplopia


(3) Hyperlipidemia


(4) Hypothyroidism


(5) Kidney stone


(6) Neuropathy


(7) RUQ pain


(8) RUQ rigidity


Surgical Problems:


(1) History of back surgery








Family History





Cancer


Diabetes mellitus


Heart disease


Hypertension


Kidney stones





Social History


Smoking Status:  Never Smoker


Alcohol Use:  none


Drug Use:  none


Marital Status:  


Housing Status:  lives alone


Occupation Status:  retired





Current/Historical Medications


Scheduled


Allopurinol (Zyloprim), 100 MG PO QAM


Amiloride Hcl (Amiloride Hcl), 5 MG PO QAM


Aspirin (Aspirin Ec), 81 MG PO DAILY


Atorvastatin (Lipitor), 10 MG PO DAILY


Cyanocobalamin (B-12), 1,000 MCG PO QAM


Gabapentin (Gabapentin), 300 MG PO TID


Levothyroxine Sodium (Levothyroxine Sodium), 75 MCG PO QAM


Magnesium Oxide (Mg Supplement (Magnesium), 400 MG PO NOON


Metformin HCl (Metformin HCl), 500 MG PO TID


Omega-3 Fatty Acids (Fish Oil 1200 mg), 1,200 MG PO TID


Paroxetine (Paxil), 20 MG PO QAM


Potassium Citrate (Alkalinizer (Potassium Citrate ER), 2 TAB PO BID





Scheduled PRN


Alprazolam (Xanax), 1 MG PO BID PRN for Anxiety





Allergies


Coded Allergies:  


     Iodinated Diagnostic Agents (Verified  Allergy, Severe, HIVES AND THROAT 

SWELLING, 8/13/17)





Physical Exam


Vital Signs











  Date Time  Temp Pulse Resp B/P (MAP) Pulse Ox O2 Delivery O2 Flow Rate FiO2


 


8/13/17 05:19  66 14 171/85 95 Room Air  


 


8/13/17 04:41  59 22  95   


 


8/13/17 04:31    196/108    


 


8/13/17 04:16  66      


 


8/13/17 04:11  63 19  96   


 


8/13/17 04:01    179/106    


 


8/13/17 03:41  65 14  94   


 


8/13/17 03:36  65 14  95   


 


8/13/17 03:31    185/93    


 


8/13/17 03:06  69 18  97   


 


8/13/17 03:01    168/85    


 


8/13/17 02:36  66 12  95   


 


8/13/17 02:31    172/86    


 


8/13/17 02:20  71 15  95   


 


8/13/17 02:01    153/64    


 


8/13/17 01:50  76      


 


8/13/17 01:45  78 18     


 


8/13/17 01:15  72 13  97   


 


8/13/17 01:01    163/88    


 


8/13/17 00:45  85 21  99   


 


8/13/17 00:43     100 Room Air  


 


8/13/17 00:43  68      


 


8/13/17 00:42  72 22 171/89 99 Room Air  


 


8/13/17 00:25 36.7 79 18 164/83 99 Room Air  











Physical Exam


GENERAL: Awake, alert, well-appearing, in no distress


HENT: Normocephalic, atraumatic. Dry mucus membranes.


EYES: Normal conjunctiva. Sclera non-icteric.


NECK: Supple. No nuchal rigidity. FROM. No JVD.


RESPIRATORY: Clear to auscultation.


CARDIAC: Regular rate, normal rhythm. Extremities warm and well perfused. 

Pulses equal.


ABDOMEN: Soft, non-distended. Mild RUQ epigastric tenderness to palpation. 

Right flank pain. No peritoneal signs. No rebound or guarding. No masses. Obese.


RECTAL: Deferred.


MUSCULOSKELETAL: Chest examination reveals no tenderness. The back is 

symmetrical on inspection without obvious abnormality. There is no CVA 

tenderness to palpation. No joint edema. 


LOWER EXTREMITIES: Calves are equal size bilaterally and non-tender. No edema. 

No discoloration. 


NEURO: Normal sensorium. No sensory or motor deficits noted. 


SKIN: No rash or jaundice noted.





Medical Decision & Procedures


Laboratory Results


8/13/17 01:13








Red Blood Count 3.99, Mean Corpuscular Volume 88.7, Mean Corpuscular Hemoglobin 

29.1, Mean Corpuscular Hemoglobin Concent 32.8, Mean Platelet Volume 11.9, 

Neutrophils (%) (Auto) 45.8, Lymphocytes (%) (Auto) 42.0, Monocytes (%) (Auto) 

10.2, Eosinophils (%) (Auto) 1.3, Basophils (%) (Auto) 0.4, Neutrophils # (Auto

) 4.72, Lymphocytes # (Auto) 4.32, Monocytes # (Auto) 1.05, Eosinophils # (Auto

) 0.13, Basophils # (Auto) 0.04





8/13/17 01:13








8/13/17 02:43

















Test


  8/13/17


01:13 8/13/17


01:55 8/13/17


02:43 8/13/17


03:10


 


White Blood Count


  10.29 K/uL


(4.8-10.8) 


  


  


 


 


Red Blood Count


  3.99 M/uL


(4.2-5.4) 


  


  


 


 


Hemoglobin


  11.6 g/dL


(12.0-16.0) 


  


  


 


 


Hematocrit 35.4 % (37-47)    


 


Mean Corpuscular Volume


  88.7 fL


() 


  


  


 


 


Mean Corpuscular Hemoglobin


  29.1 pg


(25-34) 


  


  


 


 


Mean Corpuscular Hemoglobin


Concent 32.8 g/dl


(32-36) 


  


  


 


 


Platelet Count


  222 K/uL


(130-400) 


  


  


 


 


Mean Platelet Volume


  11.9 fL


(7.4-10.4) 


  


  


 


 


Neutrophils (%) (Auto) 45.8 %    


 


Lymphocytes (%) (Auto) 42.0 %    


 


Monocytes (%) (Auto) 10.2 %    


 


Eosinophils (%) (Auto) 1.3 %    


 


Basophils (%) (Auto) 0.4 %    


 


Neutrophils # (Auto)


  4.72 K/uL


(1.4-6.5) 


  


  


 


 


Lymphocytes # (Auto)


  4.32 K/uL


(1.2-3.4) 


  


  


 


 


Monocytes # (Auto)


  1.05 K/uL


(0.11-0.59) 


  


  


 


 


Eosinophils # (Auto)


  0.13 K/uL


(0-0.5) 


  


  


 


 


Basophils # (Auto)


  0.04 K/uL


(0-0.2) 


  


  


 


 


RDW Standard Deviation


  46.6 fL


(36.4-46.3) 


  


  


 


 


RDW Coefficient of Variation


  14.3 %


(11.5-14.5) 


  


  


 


 


Immature Granulocyte % (Auto) 0.3 %    


 


Immature Granulocyte # (Auto)


  0.03 K/uL


(0.00-0.02) 


  


  


 


 


Anion Gap


  9.0 mmol/L


(3-11) 


  


  


 


 


Est Creatinine Clear Calc


Drug Dose 63.5 ml/min 


  


  


  


 


 


Estimated GFR (


American) 79.9 


  


  


  


 


 


Estimated GFR (Non-


American 68.9 


  


  


  


 


 


BUN/Creatinine Ratio 14.4 (10-20)    


 


Calcium Level


  8.7 mg/dl


(8.5-10.1) 


  


  


 


 


Total Bilirubin


  0.4 mg/dl


(0.2-1) 


  


  


 


 


Aspartate Amino Transf


(AST/SGOT)  U/L (15-37) 


  


  


  


 


 


Alanine Aminotransferase


(ALT/SGPT) 42 U/L (12-78) 


  


  


  


 


 


Alkaline Phosphatase


  104 U/L


() 


  


  


 


 


Total Protein


  6.8 gm/dl


(6.4-8.2) 


  


  


 


 


Albumin


  3.5 gm/dl


(3.4-5.0) 


  


  


 


 


Lipase


  286 U/L


() 


  


  


 


 


Urine Color  YELLOW   


 


Urine Appearance  CLEAR (CLEAR)   


 


Urine pH  7.5 (4.5-7.5)   


 


Urine Specific Gravity


  


  1.010


(1.000-1.030) 


  


 


 


Urine Protein  NEG (NEG)   


 


Urine Glucose (UA)  NEG (NEG)   


 


Urine Ketones  NEG (NEG)   


 


Urine Occult Blood  NEG (NEG)   


 


Urine Nitrite  NEG (NEG)   


 


Urine Bilirubin  NEG (NEG)   


 


Urine Urobilinogen  NEG (NEG)   


 


Urine Leukocyte Esterase  SMALL (NEG)   


 


Urine WBC (Auto)


  


  5-10 /hpf


(0-5) 


  


 


 


Urine RBC (Auto)  0-4 /hpf (0-4)   


 


Urine Hyaline Casts (Auto)  0 /lpf (0-5)   


 


Urine Epithelial Cells (Auto)  0-5 /lpf (0-5)   


 


Urine Bacteria (Auto)  NEG (NEG)   


 


Direct Bilirubin    mg/dl (0-0.2)  


 


Chemistry Specimen Hemolysis     


 


Lactic Acid Level


  


  


  


  2.8 mmol/L


(0.4-2.0)





Laboratory results reviewed by me





Medications Administered











 Medications


  (Trade)  Dose


 Ordered  Sig/Khang


 Route  Start Time


 Stop Time Status Last Admin


Dose Admin


 


 Ondansetron HCl


  (Zofran Inj)  4 mg  NOW  STAT


 IV  8/13/17 00:54


 8/13/17 00:56 DC 8/13/17 01:18


4 MG


 


 Sodium Chloride  1,000 ml @ 


 999 mls/hr  Q1H1M STAT


 IV  8/13/17 00:54


 8/13/17 01:54 DC 8/13/17 01:18


999 MLS/HR


 


 Ceftriaxone


 Sodium 2000 mg/


 Dextrose  70 ml @ 


 100 mls/hr  ONE  STAT


 IV  8/13/17 03:46


 8/13/17 04:27 DC 8/13/17 05:17


100 MLS/HR


 


 Sodium Chloride  1,000 ml @ 


 999 mls/hr  Q1H1M STAT


 IV  8/13/17 03:46


 8/13/17 04:46 DC 8/13/17 04:41


999 MLS/HR











Procedure


BEDSIDE US: Showed no gallstones. No pericystic fluid. CBD appears within 

normal limits.





ED Course


0042: The patient was evaluated in room B7. A complete history and physical 

exam was performed.





0054: Ordered NSS 1000 ml @ 999 mls/hr IV, Zofran Inj 4 mg IV.





Medical Decision


I reviewed the patient's past medical history, medications, and the nursing 

notes as described above.





Differential diagnoses include renal stone, calculus cholecystitis, 

diverticulitis, obstruction, GERD, pancreatitis, gastritis.





The patient is a 66-year-old woman with a past medical history of renal stones 

into the emergency department with worsening of right flank and right upper 

quadrant abdominal pain over the past 2 weeks previous history of present 

illness.  Arrival patient appears mildly uncomfortable but in no acute 

distress.  Is afebrile with stable vital signs.  Limited bedside ultrasound of 

the patient's gallbladder showed no gallstones or pericholecystic fluid.  

However limited bedside ultrasound of the patient's right kidney with question 

mild Hydro.  Considering the patient's history of renal stones a CT scan was 

ordered however without contrast in setting of patient's contrast allergy which 

will limit diagnosis for alternate diagnoses.  Otherwise patient's WBC is 

within normal limits.  Lactate 3.1 however with hemolyzed sample.  Will repeat.





Repeat lactate 2.8.  CT scan stat rad read showing multiple bilateral stones 

without any signs of obstruction. Elevated lactate possibly 2/2 to patient's 

metformin. However, UA with 5-10 whites and esterase positive.  In the setting 

of the patient's renal stones as well as elevated lactate will treat with 

ceftriaxone for now, as the patient is otherwise nontoxic appearing.  

Considering well-appearing and reassuring CT scan read will defer urology 

involvement until a.m.  Chest x-ray ordered and pending to evaluate for 

possible alternative causes for elevated lactate, although patient denies any 

chest pain or shortness of breath.  Will admit to medicine for further 

management and urology consultation in the morning..





Medication Reconcilliation


Current Medication List:  was personally reviewed by me





Blood Pressure Screening


Patient's blood pressure:  Elevated blood pressure


Blood pressure disposition:  Elevated BP felt to be situational





Impression





 Primary Impression:  


 Right upper quadrant abdominal pain


 Additional Impression:  


 Right flank pain





Scribe Attestation


The scribe's documentation has been prepared under my direction and personally 

reviewed by me in its entirety. I confirm that the note above accurately 

reflects all work, treatment, procedures, and medical decision making performed 

by me.





Departure Information


Referrals


Asaf Muse M.D. (PCP)





Patient Instructions


Kidney Stones - [facility], [facility]





Additional Instructions





Please follow up with your primary care physician in the next 1-3 days as well 

as with your urologist next week.





Your exam, labs results, and CT scan did not show signs of an emergent 

condition at this time.





Ibuprofen as needed for pain.


Oxycodone as needed for breakthrough pain.


Zofran as needed for nausea.


Antibiotics as directed.





Return to the emergency department for worsening symptoms as described in the 

accompanying instructions.





Problem Qualifiers

## 2017-08-13 NOTE — DIAGNOSTIC IMAGING REPORT
ABDOMEN AND PELVIS CT WITHOUT CONTRAST



CT DOSE: 1597.16 mGy.cm



HISTORY:      right flank pain - r/o stone



TECHNIQUE: Multiaxial CT images of the abdomen and pelvis were performed without

contrast.  A dose lowering technique was utilized adhering to the principles of

ALARA.



COMPARISON STUDY: Abdomen and pelvis CT 9/1/2013.



FINDINGS: A 3 mm nodule within the right lower lobe on image 9. No

pneumoperitoneum. No pneumatosis. No acute fractures within the visualized

osseous structures. The unenhanced liver, spleen, gallbladder, pancreas, and

adrenal glands are unremarkable. No retroperitoneal lymphadenopathy. Normal

bladder. Hysterectomy. No pelvic free fluid. Bilateral nephrolithiasis with the

largest stones measuring up to 4 mm. No ureteral stones. No hydronephrosis.

Suboptimal evaluation for bowel pathology due to the lack of intravenous and

oral contrast. However, there is no definite bowel wall thickening or

obstruction. A few colonic diverticula. The appendix appears surgically absent.



IMPRESSION: 



1. Bilateral nephrolithiasis. No ureteral stones. No hydronephrosis.

2. No bowel wall thickening or obstruction.

4. A 3 mm nodule within the right lower lobe. 







Electronically signed by:  Nilson Seth M.D.

8/13/2017 7:14 AM



Dictated Date/Time:  8/13/2017 7:10 AM

## 2017-08-13 NOTE — DIAGNOSTIC IMAGING REPORT
ABDOMINAL ULTRASOUND, RIGHT UPPER QUADRANT



HISTORY:      Pt c/o RUQ abd pain.



COMPARISON:  None.



FINDINGS:



Pancreas: The pancreatic tail is obscured by overlying bowel gas. The remaining

portions of the pancreas are within normal limits.



Liver: The liver is echogenic consistent with fatty change. The liver is

enlarged measuring 23 cm in length.



Gallbladder: No gallbladder wall thickening. No gallstones. Small amount of

gallbladder sludge.



CBD: 4 mm.



Right kidney: No hydronephrosis.



IMPRESSION: 



1. Small amount of gallbladder sludge. No gallstones. No gallbladder wall

thickening.

2. Hepatomegaly demonstrating fatty change.







Electronically signed by:  Nilson Seth M.D.

8/13/2017 8:30 AM



Dictated Date/Time:  8/13/2017 8:28 AM

## 2017-08-13 NOTE — PRE-OPERATIVE CONSULTATION
History


General


Date of Service:


Aug 13, 2017.





HPI


HPI:


The patient is a 66 year old female being seen for possible gallbladder 

disease.  She complaints of constant abdominal pain for three weeks which is 

mainly right sided. It radiates across her abdomen and into her back. She 

describes the pain as a bloating and associated with nausea.  She also has some 

diarrhea.  She also notes recent hematuria. She denies melena. The patient 

notes a history of kidney stones, but states this does not feel the same.


Historian:  patient


Procedure Urgency:  Acute





Risk Assessment


Daily beta blocker use?:  No





Problem List


Medical Problems:


(1) Fever of unknown origin


Status: Acute  





(2) Sepsis


Status: Acute  











Medical & Surgical History


Past Medical History:  diabetes, high cholesterol, hypertension, hypothyroidism

, kidney stones


Past Surgical History:  hysterectomy, lithotripsy





Family History


Family History:  cancer, diabetes, heart disease, hypertension, renal disease





Social History


Hx Tobacco Use In Past Year?:  No


Smoking Status:  Never Smoker


Alcohol:  none


Drug Use:  none


Marital status:  


Housing status:  lives with family


Occupation status:  retired





Immunizations


Have You Had Influenza Vaccine:  No


Date Of Influenza Vaccine:  Nov 5, 2012


Have You Had Tetanus Vaccine:  Yes


Date Of Tetanus Immunization:  Dec 5, 2002


History of Pneumococcal:  No


History Hepatitis B Vaccine:  No





Allergies


Allergies:  


Coded Allergies:  


     Iodinated Diagnostic Agents (Verified  Allergy, Severe, HIVES AND THROAT 

SWELLING, 8/13/17)





Medications


Current Inpatient Medications





Current Inpatient Medications








 Medications


  (Trade)  Dose


 Ordered  Sig/Khang


 Route  Start Time


 Stop Time Status Last Admin


Dose Admin


 


 Acetaminophen


  (Tylenol Tab)  650 mg  Q4H  PRN


 PO  8/13/17 05:30


 9/12/17 05:29   


 


 


 Al Hydrox/Mg


 Hydrox/Simethicone


  (Maalox Max Susp)  15 ml  Q4H  PRN


 PO  8/13/17 05:30


 9/12/17 05:29   


 


 


 Magnesium


 Hydroxide


  (Milk Of


 Magnesia Susp)  30 ml  Q6H  PRN


 PO  8/13/17 05:30


 9/12/17 05:29   


 


 


 Polyethylene


  (Miralax Powder


 Packet)  17 gm  DAILY  PRN


 PO  8/13/17 05:30


 9/12/17 05:29   


 


 


 Ondansetron HCl


  (Zofran Inj)  4 mg  Q6H  PRN


 IV  8/13/17 05:30


 9/12/17 05:29  8/13/17 06:16


4 MG


 


 Potassium


 Chloride/Sodium


 Chloride  1,000 ml @ 


 125 mls/hr  Q8H


 IV  8/13/17 06:30


 9/12/17 06:29  8/13/17 07:14


125 MLS/HR


 


 Allopurinol


  (Zyloprim Tab)  100 mg  QAM


 PO  8/13/17 09:00


 9/12/17 08:59   


 


 


 Alprazolam


  (Xanax Tab)  1 mg  BID  PRN


 PO  8/13/17 05:30


 9/12/17 05:29   


 


 


 Atorvastatin


 Calcium


  (Lipitor Tab)  10 mg  DAILY


 PO  8/13/17 09:00


 9/12/17 08:59   


 


 


 Gabapentin


  (Neurontin Cap)  300 mg  TID


 PO  8/13/17 09:00


 9/12/17 08:59   


 


 


 Levothyroxine


 Sodium


  (Synthroid Tab)  75 mcg  DAILYBB


 PO  8/13/17 06:14


 9/12/17 06:59   


 


 


 Paroxetine HCl


  (pAXil TAB)  20 mg  QAM


 PO  8/13/17 09:00


 9/12/17 08:59   


 


 


 Potassium Citrate


  (Urocit-K Tab)  10 meq  BID


 PO  8/13/17 09:00


 9/12/17 08:59   


 


 


 Amiloride HCl


  (Amiloride HCl)  5 mg  QAM


 PO  8/13/17 09:00


 9/12/17 08:59   


 


 


 Miscellaneous


  (Iv Fluids


 Completed)  1 ea  PRN  PRN


 N/A  8/13/17 06:00


 8/13/18 05:59   


 


 


 Glucose


  (Glucose 40% Gel)  15-30


 GRAMS 15


 GRAMS...  UD  PRN


 PO  8/13/17 06:30


 9/12/17 06:29   


 


 


 Glucose


  (Glucose Chew


 Tab)  4-8


 Tablets 4


 Tabl...  UD  PRN


 PO  8/13/17 06:30


 9/12/17 06:29   


 


 


 Dextrose


  (Dextrose 50%


 50ML Syringe)  25-50ML OF


 50% DW IV


 FOR...  UD  PRN


 IV  8/13/17 06:30


 9/12/17 06:29   


 


 


 Glucagon


  (Glucagon Inj)  1 mg  UD  PRN


 SQ  8/13/17 06:30


 9/12/17 06:29   


 


 


 Insulin Aspart


  (novoLOG ASPART)  **SLIDING


 SCALE**


 **G...  Q6


 SC  8/13/17 12:00


 9/12/17 11:59   


 











Review of Systems


Review of Systems


Constitutional:  denies chills, denies diaphoresis, denies fever, denies 

weakness


Eyes:  reports: no symptoms


ENT:  reports: no symptoms reported


Cardiovascular:  denies: chest pain, chest tightness, chest pressure, 

palpitations


Respiratory:  denies: cough, short of breath, stridor


Gastrointestinal:  abdominal pain, denies constipation, diarrhea, nausea, 

denies vomiting


Genitourinary - Female:  reports: no symptoms


Musculoskeletal:  back pain, denies joint pain, denies joint swelling, denies 

muscle stiffness


Integumentary:  denies change in hair/nails, denies dryness, denies lumps, 

denies rash


Neurologic:  reports: no symptoms


Psychiatric:  reports: no symptoms


Endocrine:  denies: cold intolerance, heat intolerance, hair changes, goiter


Hematologic / Lymphatic:  denies: anemia, easy bleeding, easy bruising


Allergic / Immunologic:  no symptoms





Physical Exam


Physical Exam


General Appearance:  + WD/WN, No distress


Ears, Nose, Throat:  + normal ENT inspection


Neck:  No tracheal deviation, No lymphadenophy, No stiffness, No tenderness


Respiratory:  No chest tenderness, No accessory muscle use, No decreased breath 

sounds, No rhonchi, No stridor, No wheezing


Cardiovascular:  No tachycardia, No gallop/S3, No diastolic murmur, No gallop/S4

, No bradycardia, No systolic murmur


Abdomen:  + tenderness, No abnormal bowel sounds, No distension, No hernia, No 

organomegaly, No guarding


Extremities:  No deformity, No swelling, No calf tenderness, No inflammation


Neurologic/Psychiatric:  No motor deficit/weakness, No disorientation, No 

sensory deficit


Skin Characteristics:  No diaphoresis, No pallor, No jaundice, No rash


Lymphatic:  No abnormal adenopathy





Diagnostics


Labs


Labs





Results Past 24 Hours








Test


  8/13/17


01:13 8/13/17


01:55 8/13/17


02:06 8/13/17


02:43 Range/Units


 


 


White Blood Count 10.29    4.8-10.8  K/uL


 


Red Blood Count 3.99    4.2-5.4  M/uL


 


Hemoglobin 11.6    12.0-16.0  g/dL


 


Hematocrit 35.4    37-47  %


 


Mean Corpuscular Volume 88.7      fL


 


Mean Corpuscular Hemoglobin 29.1    25-34  pg


 


Mean Corpuscular Hemoglobin


Concent 32.8


  


  


  


  32-36  g/dl


 


 


Platelet Count 222    130-400  K/uL


 


Mean Platelet Volume 11.9    7.4-10.4  fL


 


Neutrophils (%) (Auto) 45.8     %


 


Lymphocytes (%) (Auto) 42.0     %


 


Monocytes (%) (Auto) 10.2     %


 


Eosinophils (%) (Auto) 1.3     %


 


Basophils (%) (Auto) 0.4     %


 


Neutrophils # (Auto) 4.72    1.4-6.5  K/uL


 


Lymphocytes # (Auto) 4.32    1.2-3.4  K/uL


 


Monocytes # (Auto) 1.05    0.11-0.59  K/uL


 


Eosinophils # (Auto) 0.13    0-0.5  K/uL


 


Basophils # (Auto) 0.04    0-0.2  K/uL


 


RDW Standard Deviation 46.6    36.4-46.3  fL


 


RDW Coefficient of Variation 14.3    11.5-14.5  %


 


Immature Granulocyte % (Auto) 0.3     %


 


Immature Granulocyte # (Auto) 0.03    0.00-0.02  K/uL


 


Sodium Level 140    136-145  mmol/L


 


Potassium Level    4.0 3.5-5.1  mmol/L


 


Chloride Level 105      mmol/L


 


Carbon Dioxide Level 26    21-32  mmol/L


 


Anion Gap 9.0    3-11  mmol/L


 


Blood Urea Nitrogen 13    7-18  mg/dl


 


Creatinine


  0.87


  


  


  


  0.60-1.20


mg/dl


 


Est Creatinine Clear Calc


Drug Dose 63.5


  


  


  


   ml/min


 


 


Estimated GFR (


American) 79.9


  


  


  


   


 


 


Estimated GFR (Non-


American 68.9


  


  


  


   


 


 


BUN/Creatinine Ratio 14.4    10-20  


 


Random Glucose 120    70-99  mg/dl


 


Lactic Acid Level 3.1    0.4-2.0  mmol/L


 


Calcium Level 8.7    8.5-10.1  mg/dl


 


Total Bilirubin 0.4    0.2-1  mg/dl


 


Direct Bilirubin     0-0.2  mg/dl


 


Aspartate Amino Transf


(AST/SGOT) 


  


  


  


  15-37  U/L


 


 


Alanine Aminotransferase


(ALT/SGPT) 42


  


  


  


  12-78  U/L


 


 


Alkaline Phosphatase 104      U/L


 


Total Protein 6.8    6.4-8.2  gm/dl


 


Albumin 3.5    3.4-5.0  gm/dl


 


Lipase 286      U/L


 


Urine Color  YELLOW    


 


Urine Appearance  CLEAR   CLEAR  


 


Urine pH  7.5   4.5-7.5  


 


Urine Specific Gravity  1.010   1.000-1.030  


 


Urine Protein  NEG   NEG  


 


Urine Glucose (UA)  NEG   NEG  


 


Urine Ketones  NEG   NEG  


 


Urine Occult Blood  NEG   NEG  


 


Urine Nitrite  NEG   NEG  


 


Urine Bilirubin  NEG   NEG  


 


Urine Urobilinogen  NEG   NEG  


 


Urine Leukocyte Esterase  SMALL   NEG  


 


Urine WBC (Auto)  5-10   0-5  /hpf


 


Urine RBC (Auto)  0-4   0-4  /hpf


 


Urine Hyaline Casts (Auto)  0   0-5  /lpf


 


Urine Epithelial Cells (Auto)  0-5   0-5  /lpf


 


Urine Bacteria (Auto)  NEG   NEG  


 


Chemistry Specimen Hemolysis      


 


Test


  8/13/17


03:10 8/13/17


06:59 


  


  Range/Units


 


 


Lactic Acid Level 2.8    0.4-2.0  mmol/L


 


Bedside Glucose  109   70-90  mg/dl








Microbiology Results


8/13/17 Blood Culture, Received


          Pending


8/13/17 Blood Culture, Received


          Pending





Diagnostic Radiology


Diagnostic Radiology


ABDOMINAL ULTRASOUND, RIGHT UPPER QUADRANT





HISTORY:      Pt c/o RUQ abd pain.





COMPARISON:  None.





FINDINGS:





Pancreas: The pancreatic tail is obscured by overlying bowel gas. The remaining


portions of the pancreas are within normal limits.





Liver: The liver is echogenic consistent with fatty change. The liver is


enlarged measuring 23 cm in length.





Gallbladder: No gallbladder wall thickening. No gallstones. Small amount of


gallbladder sludge.





CBD: 4 mm.





Right kidney: No hydronephrosis.





IMPRESSION: 





1. Small amount of gallbladder sludge. No gallstones. No gallbladder wall


thickening.


2. Hepatomegaly demonstrating fatty change.








ABDOMEN AND PELVIS CT WITHOUT CONTRAST





CT DOSE: 1597.16 mGy.cm





HISTORY:      right flank pain - r/o stone





TECHNIQUE: Multiaxial CT images of the abdomen and pelvis were performed without


contrast.  A dose lowering technique was utilized adhering to the principles of


ALARA.





COMPARISON STUDY: Abdomen and pelvis CT 9/1/2013.





FINDINGS: A 3 mm nodule within the right lower lobe on image 9. No


pneumoperitoneum. No pneumatosis. No acute fractures within the visualized


osseous structures. The unenhanced liver, spleen, gallbladder, pancreas, and


adrenal glands are unremarkable. No retroperitoneal lymphadenopathy. Normal


bladder. Hysterectomy. No pelvic free fluid. Bilateral nephrolithiasis with the


largest stones measuring up to 4 mm. No ureteral stones. No hydronephrosis.


Suboptimal evaluation for bowel pathology due to the lack of intravenous and


oral contrast. However, there is no definite bowel wall thickening or


obstruction. A few colonic diverticula. The appendix appears surgically absent.





IMPRESSION: 





1. Bilateral nephrolithiasis. No ureteral stones. No hydronephrosis.


2. No bowel wall thickening or obstruction.


4. A 3 mm nodule within the right lower lobe.





Impression


Assessment and Plan


Assessment and Plan


Possible biliary colic/chronic cholecystitis


-agree with HIDA


-urology to assess stones but unlikely source of pain without ureteral stone or 

hydronephrosis


-will see results tomorrow


-if GB is source of pain then plan lap estevan on Tuesday

## 2017-08-14 VITALS
HEART RATE: 56 BPM | SYSTOLIC BLOOD PRESSURE: 132 MMHG | TEMPERATURE: 97.7 F | DIASTOLIC BLOOD PRESSURE: 79 MMHG | OXYGEN SATURATION: 96 %

## 2017-08-14 VITALS — SYSTOLIC BLOOD PRESSURE: 158 MMHG | HEART RATE: 56 BPM | DIASTOLIC BLOOD PRESSURE: 93 MMHG

## 2017-08-14 VITALS
TEMPERATURE: 98.24 F | HEART RATE: 58 BPM | SYSTOLIC BLOOD PRESSURE: 170 MMHG | OXYGEN SATURATION: 97 % | DIASTOLIC BLOOD PRESSURE: 78 MMHG

## 2017-08-14 VITALS
TEMPERATURE: 98.24 F | DIASTOLIC BLOOD PRESSURE: 81 MMHG | HEART RATE: 55 BPM | SYSTOLIC BLOOD PRESSURE: 172 MMHG | OXYGEN SATURATION: 97 %

## 2017-08-14 LAB
ANION GAP SERPL CALC-SCNC: 5 MMOL/L (ref 3–11)
BUN SERPL-MCNC: 8 MG/DL (ref 7–18)
BUN/CREAT SERPL: 11.1 (ref 10–20)
CALCIUM SERPL-MCNC: 8 MG/DL (ref 8.5–10.1)
CHLORIDE SERPL-SCNC: 112 MMOL/L (ref 98–107)
CO2 SERPL-SCNC: 28 MMOL/L (ref 21–32)
CREAT CL PREDICTED SERPL C-G-VRATE: 77.8 ML/MIN
CREAT SERPL-MCNC: 0.71 MG/DL (ref 0.6–1.2)
EOSINOPHIL NFR BLD AUTO: 192 K/UL (ref 130–400)
GLUCOSE SERPL-MCNC: 95 MG/DL (ref 70–99)
HCT VFR BLD CALC: 33.4 % (ref 37–47)
MCH RBC QN AUTO: 27.9 PG (ref 25–34)
MCHC RBC AUTO-ENTMCNC: 31.1 G/DL (ref 32–36)
MCV RBC AUTO: 89.5 FL (ref 80–100)
PMV BLD AUTO: 11.8 FL (ref 7.4–10.4)
POTASSIUM SERPL-SCNC: 3.8 MMOL/L (ref 3.5–5.1)
RBC # BLD AUTO: 3.73 M/UL (ref 4.2–5.4)
SODIUM SERPL-SCNC: 145 MMOL/L (ref 136–145)
WBC # BLD AUTO: 7.19 K/UL (ref 4.8–10.8)

## 2017-08-14 RX ADMIN — GABAPENTIN SCH MG: 300 CAPSULE ORAL at 20:49

## 2017-08-14 RX ADMIN — POTASSIUM CITRATE SCH MEQ: 10 TABLET ORAL at 20:49

## 2017-08-14 RX ADMIN — SODIUM CHLORIDE AND POTASSIUM CHLORIDE SCH MLS/HR: 9; 1.49 INJECTION, SOLUTION INTRAVENOUS at 14:10

## 2017-08-14 RX ADMIN — INSULIN ASPART SCH UNITS: 100 INJECTION, SOLUTION INTRAVENOUS; SUBCUTANEOUS at 11:54

## 2017-08-14 RX ADMIN — INSULIN ASPART SCH UNITS: 100 INJECTION, SOLUTION INTRAVENOUS; SUBCUTANEOUS at 08:00

## 2017-08-14 RX ADMIN — POTASSIUM CITRATE SCH MEQ: 10 TABLET ORAL at 09:27

## 2017-08-14 RX ADMIN — GABAPENTIN SCH MG: 300 CAPSULE ORAL at 09:28

## 2017-08-14 RX ADMIN — LEVOTHYROXINE SODIUM SCH MCG: 75 TABLET ORAL at 05:48

## 2017-08-14 RX ADMIN — GABAPENTIN SCH MG: 300 CAPSULE ORAL at 14:11

## 2017-08-14 RX ADMIN — ATORVASTATIN CALCIUM SCH MG: 10 TABLET, FILM COATED ORAL at 09:28

## 2017-08-14 RX ADMIN — INSULIN ASPART SCH UNITS: 100 INJECTION, SOLUTION INTRAVENOUS; SUBCUTANEOUS at 17:15

## 2017-08-14 RX ADMIN — ALLOPURINOL SCH MG: 100 TABLET ORAL at 09:26

## 2017-08-14 RX ADMIN — INSULIN ASPART SCH UNITS: 100 INJECTION, SOLUTION INTRAVENOUS; SUBCUTANEOUS at 20:49

## 2017-08-14 RX ADMIN — AMILORIDE HYDROCLORIDE SCH MG: 5 TABLET ORAL at 09:54

## 2017-08-14 RX ADMIN — PAROXETINE SCH MG: 20 TABLET, FILM COATED ORAL at 09:28

## 2017-08-14 RX ADMIN — SODIUM CHLORIDE AND POTASSIUM CHLORIDE SCH MLS/HR: 9; 1.49 INJECTION, SOLUTION INTRAVENOUS at 20:50

## 2017-08-14 RX ADMIN — SODIUM CHLORIDE AND POTASSIUM CHLORIDE SCH MLS/HR: 9; 1.49 INJECTION, SOLUTION INTRAVENOUS at 05:48

## 2017-08-14 NOTE — SURGERY PROGRESS NOTE
Surgery Progress Note


Date of Service


Aug 14, 2017.





Subjective


Post OP Day:  HD 2


+ complaints (pain about the same), + flatus, + nausea, No vomiting





Objective


Vital Signs:











  Date Time  Temp Pulse Resp B/P (MAP) Pulse Ox O2 Delivery O2 Flow Rate FiO2


 


8/13/17 23:36      Room Air  


 


8/13/17 23:04 36.8 60 16 152/77 (102) 95 Room Air  


 


8/13/17 16:00     97 Room Air  


 


8/13/17 15:15 37.0 59 17 151/78 (102) 97 Room Air  


 


8/13/17 07:35      Room Air  


 


8/13/17 07:24 36.7 57 19 145/78 (100) 97 Room Air  


 


8/13/17 06:21 36.6 67 18 155/85 98 Room Air  








General Appearance:  WD/WN, no apparent distress


Head:  normocephalic, atraumatic


Neck:  supple, trachea midline


Respiratory/Chest:  lungs clear


Cardiovascular:  regular rate, rhythm


Abdomen:  normal bowel sounds, non distended, soft, + tenderness (moderate)


Extremities:  non-tender, no pedal edema


Laboratory Results:





Results Past 24 Hours








Test


  8/13/17


06:59 8/13/17


12:07 8/13/17


17:20 8/13/17


20:43 Range/Units


 


 


Bedside Glucose 109 113 98 94 70-90  mg/dl


 


Test


  8/14/17


05:07 


  


  


  Range/Units


 











Assessment & Plan


chronic cholecystitis w/sludge


-HIDA does not show obstruction


-pain is c/w GB source


-medically needs to be cleared for surgery


-will plan lap estevan in AM

## 2017-08-14 NOTE — UROLOGY CONSULTATION
History


General


Date of Service:


Aug 14, 2017.


Chief Complaint:  RUQ abdominal pain


Primary Care Physician:


Asaf Muse M.D.


Pt seen a urologist before?:  Yes (Dr. Fredo Collins)


If yes, why?:  nephrolithiasis





History of Present Illness


67 yo female admitted with RUQ abdominal pain that started 2 days ago. 


 consulted d/t hx of nephrolithiasis. 


Non-contrast CT scan showing bilateral renal stones, but no ureteral stones or 

obstruction visualized. 


The pt has a lengthy hx of stones requiring multiple ESWLs in the past. She 

sees Dr. Collins for this issue. 


Denies dysuria or hematuria this morning. 


+ nausea. 


The pt has been evaluated by general surgery. [place] her RUQ pain is r/t 

cholecystitis with gall bladder sludge as seen on abdominal u/s. She is 

scheduled for lap estevan tomorrow.





Imaging


Imaging:  CT





Laboratory





Last 24 Hours








Test


  8/13/17


12:07 8/13/17


17:20 8/13/17


20:43 8/14/17


05:07


 


Bedside Glucose 113 mg/dl  98 mg/dl  94 mg/dl  


 


White Blood Count    7.19 K/uL 


 


Red Blood Count    3.73 M/uL 


 


Hemoglobin    10.4 g/dL 


 


Hematocrit    33.4 % 


 


Mean Corpuscular Volume    89.5 fL 


 


Mean Corpuscular Hemoglobin    27.9 pg 


 


Mean Corpuscular Hemoglobin


Concent 


  


  


  31.1 g/dl 


 


 


RDW Standard Deviation    48.0 fL 


 


RDW Coefficient of Variation    14.6 % 


 


Platelet Count    192 K/uL 


 


Mean Platelet Volume    11.8 fL 


 


Sodium Level    145 mmol/L 


 


Potassium Level    3.8 mmol/L 


 


Chloride Level    112 mmol/L 


 


Carbon Dioxide Level    28 mmol/L 


 


Anion Gap    5.0 mmol/L 


 


Blood Urea Nitrogen    8 mg/dl 


 


Creatinine    0.71 mg/dl 


 


Est Creatinine Clear Calc


Drug Dose 


  


  


  77.8 ml/min 


 


 


Estimated GFR (


American) 


  


  


  102.2 


 


 


Estimated GFR (Non-


American 


  


  


  88.1 


 


 


BUN/Creatinine Ratio    11.1 


 


Random Glucose    95 mg/dl 


 


Calcium Level    8.0 mg/dl 


 


Test


  8/14/17


08:14 


  


  


 


 


Bedside Glucose 98 mg/dl    











Problem List


Medical Problems:


(1) Fever of unknown origin


Status: Acute  





(2) Right flank pain


Status: Acute  





(3) Right upper quadrant abdominal pain


Status: Acute  





(4) Sepsis


Status: Acute  











Past History


depression, diabetes, high cholesterol, hypothyroidism, kidney stones


Past Surgical History:  lithotripsy





Family History





Cancer


Diabetes mellitus


Heart disease


Hypertension


Kidney stones





Social History


Hx Tobacco Use In Past Year?:  No


Smoking:  non-smoker


Alcohol:  never


Drug use:  none


Marital status:  


Housing status:  lives with family


Occupation status:  retired





Immunizations


History of Influenza Vaccine:  No


Influenza Vaccine Date:  Nov 5, 2012


History of Tetanus Vaccine?:  Yes


Tetanus Immunization Date:  Dec 5, 2002


History of Pneumococcal:  No


History of Hepatitis B Vaccine:  No





History of MDRO


No





Allergies


Coded Allergies:  


     Iodinated Diagnostic Agents (Verified  Allergy, Severe, HIVES AND THROAT 

SWELLING, 8/13/17)





Medications


Home Medications:





Home Meds and Scripts








 Medications  Dose


 Route/Sig


 Max Daily Dose Days Date Category


 


 Potassium Citrate


 ER (Potassium


 Citrate


  (Alkalinizer)


 1,080 Mg Tab  2 Tab


 PO BID


    8/13/17 Reported


 


 Fish Oil 1200 mg


  (Omega-3 Fatty


 Acids) 1 Cap Cap  1,200 Mg


 PO TID


    8/13/17 Reported


 


 Lipitor


  (Atorvastatin


 Calcium) 10 Mg Tab  10 Mg


 PO DAILY


    8/13/17 Reported


 


 Aspirin Ec


  (Aspirin) 81 Mg


 Tab  81 Mg


 PO DAILY


    8/13/17 Reported


 


 B-12


  (Cyanocobalamin)


 1,000 Mcg Cap  1,000 Mcg


 PO QAM


    8/20/16 Reported


 


 Levothyroxine


 Sodium 75 Mcg Tab  75 Mcg


 PO QAM


    8/20/16 Reported


 


 Metformin HCl 500


 Mg Tab  500 Mg


 PO TID


    1/28/15 Reported


 


 Magnesium


  (Magnesium Oxide


  (Mg Supplement)


 400 Mg Cap  400 Mg


 PO NOON


    1/28/15 Reported


 


 Amiloride Hcl 5


 Mg Tab  5 Mg


 PO QAM


    1/28/15 Reported


 


 Gabapentin 300 Mg


 Cap  300 Mg


 PO TID


    11/16/14 Reported


 


 Paxil (Paroxetine


 HCl) 20 Mg Tab  20 Mg


 PO QAM


    12/22/13 Reported


 


 Xanax


  (Alprazolam) 1 Mg


 Tab  1 Mg


 PO BID PRN


    12/22/13 Reported


 


 Zyloprim


  (Allopurinol) 100


 Mg Tab  100 Mg


 PO QAM


    7/13/12 Reported








Inpatient Medications:





Current Inpatient Medications








 Medications


  (Trade)  Dose


 Ordered  Sig/Khang


 Route  Start Time


 Stop Time Status Last Admin


Dose Admin


 


 Acetaminophen


  (Tylenol Tab)  650 mg  Q4H  PRN


 PO  8/13/17 05:30


 9/12/17 05:29   


 


 


 Al Hydrox/Mg


 Hydrox/Simethicone


  (Maalox Max Susp)  15 ml  Q4H  PRN


 PO  8/13/17 05:30


 9/12/17 05:29   


 


 


 Magnesium


 Hydroxide


  (Milk Of


 Magnesia Susp)  30 ml  Q6H  PRN


 PO  8/13/17 05:30


 9/12/17 05:29   


 


 


 Polyethylene


  (Miralax Powder


 Packet)  17 gm  DAILY  PRN


 PO  8/13/17 05:30


 9/12/17 05:29   


 


 


 Ondansetron HCl


  (Zofran Inj)  4 mg  Q6H  PRN


 IV  8/13/17 05:30


 9/12/17 05:29  8/13/17 06:16


4 MG


 


 Potassium


 Chloride/Sodium


 Chloride  1,000 ml @ 


 125 mls/hr  Q8H


 IV  8/13/17 06:30


 9/12/17 06:29  8/14/17 05:48


125 MLS/HR


 


 Allopurinol


  (Zyloprim Tab)  100 mg  QAM


 PO  8/13/17 09:00


 9/12/17 08:59  8/13/17 14:22


100 MG


 


 Alprazolam


  (Xanax Tab)  1 mg  BID  PRN


 PO  8/13/17 05:30


 9/12/17 05:29  8/13/17 23:07


1 MG


 


 Atorvastatin


 Calcium


  (Lipitor Tab)  10 mg  DAILY


 PO  8/13/17 09:00


 9/12/17 08:59  8/13/17 14:21


10 MG


 


 Gabapentin


  (Neurontin Cap)  300 mg  TID


 PO  8/13/17 09:00


 9/12/17 08:59  8/13/17 20:48


300 MG


 


 Levothyroxine


 Sodium


  (Synthroid Tab)  75 mcg  DAILYBB


 PO  8/13/17 06:14


 9/12/17 06:59  8/14/17 05:48


75 MCG


 


 Paroxetine HCl


  (pAXil TAB)  20 mg  QAM


 PO  8/13/17 09:00


 9/12/17 08:59  8/13/17 14:22


20 MG


 


 Potassium Citrate


  (Urocit-K Tab)  10 meq  BID


 PO  8/13/17 09:00


 9/12/17 08:59  8/13/17 20:48


10 MEQ


 


 Amiloride HCl


  (Amiloride HCl)  5 mg  QAM


 PO  8/13/17 09:00


 9/12/17 08:59  8/13/17 14:23


5 MG


 


 Miscellaneous


  (Iv Fluids


 Completed)  1 ea  PRN  PRN


 N/A  8/13/17 06:00


 8/13/18 05:59   


 


 


 Glucose


  (Glucose 40% Gel)  15-30


 GRAMS 15


 GRAMS...  UD  PRN


 PO  8/13/17 06:30


 9/12/17 06:29   


 


 


 Glucose


  (Glucose Chew


 Tab)  4-8


 Tablets 4


 Tabl...  UD  PRN


 PO  8/13/17 06:30


 9/12/17 06:29   


 


 


 Dextrose


  (Dextrose 50%


 50ML Syringe)  25-50ML OF


 50% DW IV


 FOR...  UD  PRN


 IV  8/13/17 06:30


 9/12/17 06:29   


 


 


 Glucagon


  (Glucagon Inj)  1 mg  UD  PRN


 SQ  8/13/17 06:30


 9/12/17 06:29   


 


 


 Insulin Aspart


  (novoLOG ASPART)  **SLIDING


 SCALE**


 **G...  ACHS


 SC  8/13/17 21:00


 9/12/17 11:59   


 


 


 Cefazolin Sodium  60 ml @ 


 100 mls/hr  PREOP


 IV  8/14/17 06:00


 8/14/17 18:00   


 











Review of Systems


Review of Systems


Constitutional:  No fever, No chills


Eyes:  No double vision


Neurological:  No dizzy


Endocrine:  No excessive thirst


Gastrointestinal:  + abdominal pain (RUQ), + nausea, No see HPI, No vomiting


Cardiovascular:  No chest pain


Respiratory:  No shortness of breath


Skin:  No rash


Musculoskeletal:  No back pain


Female :  + kidney stones, No painful urination, No blood in urine





Physical Exam


Vital Signs:





Vital Signs Past 12 Hours








  Date Time  Temp Pulse Resp B/P (MAP) Pulse Ox O2 Delivery O2 Flow Rate FiO2


 


8/14/17 07:04 36.5 56 19 132/79 (96) 96 Room Air  


 


8/13/17 23:36      Room Air  


 


8/13/17 23:04 36.8 60 16 152/77 (102) 95 Room Air  








Physical Exam:


General Appearance:  no apparent distress


Eyes:  bilateral eyes normal inspection


ENT:  hearing grossly normal


Neck:  no JVD


Respiratory/Chest:  no respiratory distress, no accessory muscle use


Cardiovascular:  no JVD


Extremities:  normal inspection


Neurologic/Psychiatric:  alert, normal mood/affect, oriented x 3


Skin:  normal color





Assessment & Plan


Assessment & Plan


A/P: RUQ abdominal pain, bilateral nephrolithiasis





AFVSS. 


No evidence of obstructing stone on CT. Agree with general surgery's plan as 

her pain is most likely r/t cholecystitis. Will avoid any  intervention at 

this time. 


Re-evaluate if pain persists after lap estevan. 


Thanks for the consult. No further  management at this time. Recall PRN  

issues. She is scheduled to f/u with Dr. Collins in September. Will keep as 

scheduled.

## 2017-08-14 NOTE — ANESTHESIOLOGY PROGRESS NOTE
Pre-OP Anesthesia Assessment


Date of Note


Aug 14, 2017.





Review


patient information reviewed, chart reviewed, labs reviewed, acceptable for 

surgery





Notes


The patient has a history of DM type 2, hyperlipidemia, hypothyroidism and 

nephrolithiasis. She is very active physically. She is a [place] 4 and has 

a 2 1/2 finger-breadth thyromental distance. She is an acceptable candidate for 

surgery tomorrow.

## 2017-08-14 NOTE — FAMILY MEDICINE PROGRESS NOTE
Progress Note


Date of Service


Aug 14, 2017.





Subjective


Pt evaluation today including:  conversation w/ patient, physical exam, lab 

review


Pain:  RUQ pain


Voiding:  no voiding problems


Patient was seen at the bedside. She states that her pain has improved since 

yesterday. She mostly have pain now after eating. Denies chest pain, SOB, 

nausea or vomiting.





   Constitutional:  No fever


   Respiratory:  No cough, No wheezing, No shortness of breath


   Cardiovascular:  No chest pain, No edema


   Abdomen:  + pain, No nausea, No vomiting, No diarrhea, No constipation, No 

GI bleeding (RUQ pain)


   Musculoskeletal:  No muscle pain


   Skin:  No rash





Medications





Current Inpatient Medications








 Medications


  (Trade)  Dose


 Ordered  Sig/Khang


 Route  Start Time


 Stop Time Status Last Admin


Dose Admin


 


 Acetaminophen


  (Tylenol Tab)  650 mg  Q4H  PRN


 PO  8/13/17 05:30


 9/12/17 05:29   


 


 


 Al Hydrox/Mg


 Hydrox/Simethicone


  (Maalox Max Susp)  15 ml  Q4H  PRN


 PO  8/13/17 05:30


 9/12/17 05:29   


 


 


 Magnesium


 Hydroxide


  (Milk Of


 Magnesia Susp)  30 ml  Q6H  PRN


 PO  8/13/17 05:30


 9/12/17 05:29   


 


 


 Polyethylene


  (Miralax Powder


 Packet)  17 gm  DAILY  PRN


 PO  8/13/17 05:30


 9/12/17 05:29   


 


 


 Ondansetron HCl


  (Zofran Inj)  4 mg  Q6H  PRN


 IV  8/13/17 05:30


 9/12/17 05:29  8/13/17 06:16


4 MG


 


 Potassium


 Chloride/Sodium


 Chloride  1,000 ml @ 


 125 mls/hr  Q8H


 IV  8/13/17 06:30


 9/12/17 06:29  8/14/17 14:10


125 MLS/HR


 


 Allopurinol


  (Zyloprim Tab)  100 mg  QAM


 PO  8/13/17 09:00


 9/12/17 08:59  8/14/17 09:26


100 MG


 


 Alprazolam


  (Xanax Tab)  1 mg  BID  PRN


 PO  8/13/17 05:30


 9/12/17 05:29  8/13/17 23:07


1 MG


 


 Atorvastatin


 Calcium


  (Lipitor Tab)  10 mg  DAILY


 PO  8/13/17 09:00


 9/12/17 08:59  8/14/17 09:28


10 MG


 


 Gabapentin


  (Neurontin Cap)  300 mg  TID


 PO  8/13/17 09:00


 9/12/17 08:59  8/14/17 14:11


300 MG


 


 Levothyroxine


 Sodium


  (Synthroid Tab)  75 mcg  DAILYBB


 PO  8/13/17 06:14


 9/12/17 06:59  8/14/17 05:48


75 MCG


 


 Paroxetine HCl


  (pAXil TAB)  20 mg  QAM


 PO  8/13/17 09:00


 9/12/17 08:59  8/14/17 09:28


20 MG


 


 Potassium Citrate


  (Urocit-K Tab)  10 meq  BID


 PO  8/13/17 09:00


 9/12/17 08:59  8/14/17 09:27


10 MEQ


 


 Amiloride HCl


  (Amiloride HCl)  5 mg  QAM


 PO  8/13/17 09:00


 9/12/17 08:59  8/14/17 09:54


5 MG


 


 Miscellaneous


  (Iv Fluids


 Completed)  1 ea  PRN  PRN


 N/A  8/13/17 06:00


 8/13/18 05:59   


 


 


 Glucose


  (Glucose 40% Gel)  15-30


 GRAMS 15


 GRAMS...  UD  PRN


 PO  8/13/17 06:30


 9/12/17 06:29   


 


 


 Glucose


  (Glucose Chew


 Tab)  4-8


 Tablets 4


 Tabl...  UD  PRN


 PO  8/13/17 06:30


 9/12/17 06:29   


 


 


 Dextrose


  (Dextrose 50%


 50ML Syringe)  25-50ML OF


 50% DW IV


 FOR...  UD  PRN


 IV  8/13/17 06:30


 9/12/17 06:29   


 


 


 Glucagon


  (Glucagon Inj)  1 mg  UD  PRN


 SQ  8/13/17 06:30


 9/12/17 06:29   


 


 


 Insulin Aspart


  (novoLOG ASPART)  **SLIDING


 SCALE**


 **G...  ACHS


 SC  8/13/17 21:00


 9/12/17 11:59   


 


 


 Cefazolin Sodium  60 ml @ 


 100 mls/hr  PREOP


 IV  8/14/17 06:00


 8/14/17 18:00   


 


 


 Pantoprazole


 Sodium


  (Protonix Tab)  40 mg  QAM


 PO  8/15/17 09:00


 9/14/17 08:59   


 











Objective


Vital Signs











  Date Time  Temp Pulse Resp B/P (MAP) Pulse Ox O2 Delivery O2 Flow Rate FiO2


 


8/14/17 15:11 36.8 55 18 172/81 (111) 97 Room Air  


 


8/14/17 07:30      Room Air  


 


8/14/17 07:04 36.5 56 19 132/79 (96) 96 Room Air  


 


8/13/17 23:36      Room Air  


 


8/13/17 23:04 36.8 60 16 152/77 (102) 95 Room Air  











Physical Exam


General Appearance:  WD/WN, no apparent distress


Neck:  supple, trachea midline


Respiratory/Chest:  chest non-tender, lungs clear, normal breath sounds, no 

respiratory distress, no accessory muscle use


Cardiovascular:  regular rate, rhythm, no edema


Abdomen:  normal bowel sounds, soft, + tenderness (at RUQ)


Extremities:  non-tender, no pedal edema


Neurologic/Psychiatric:  alert, normal mood/affect, oriented x 3


Skin:  normal color, warm/dry, no rash





Laboratory Results





Results Past 24 Hours








Test


  8/13/17


17:20 8/13/17


20:43 8/14/17


05:07 8/14/17


08:14 Range/Units


 


 


Bedside Glucose 98 94  98 70-90  mg/dl


 


White Blood Count   7.19  4.8-10.8  K/uL


 


Red Blood Count   3.73  4.2-5.4  M/uL


 


Hemoglobin   10.4  12.0-16.0  g/dL


 


Hematocrit   33.4  37-47  %


 


Mean Corpuscular Volume   89.5    fL


 


Mean Corpuscular Hemoglobin   27.9  25-34  pg


 


Mean Corpuscular Hemoglobin


Concent 


  


  31.1


  


  32-36  g/dl


 


 


RDW Standard Deviation   48.0  36.4-46.3  fL


 


RDW Coefficient of Variation   14.6  11.5-14.5  %


 


Platelet Count   192  130-400  K/uL


 


Mean Platelet Volume   11.8  7.4-10.4  fL


 


Sodium Level   145  136-145  mmol/L


 


Potassium Level   3.8  3.5-5.1  mmol/L


 


Chloride Level   112    mmol/L


 


Carbon Dioxide Level   28  21-32  mmol/L


 


Anion Gap   5.0  3-11  mmol/L


 


Blood Urea Nitrogen   8  7-18  mg/dl


 


Creatinine


  


  


  0.71


  


  0.60-1.20


mg/dl


 


Est Creatinine Clear Calc


Drug Dose 


  


  77.8


  


   ml/min


 


 


Estimated GFR (


American) 


  


  102.2


  


   


 


 


Estimated GFR (Non-


American 


  


  88.1


  


   


 


 


BUN/Creatinine Ratio   11.1  10-20  


 


Random Glucose   95  70-99  mg/dl


 


Calcium Level   8.0  8.5-10.1  mg/dl


 


Test


  8/14/17


11:47 


  


  


  Range/Units


 


 


Bedside Glucose 104    70-90  mg/dl











Assessment and Plan


This is a 65 y/o female with hx of nephrolithiasis presents to the hospital 

complaining of RUQ pain for past 3 wks which got worse for past couple of days. 

Initially impression is that this may be related to history of urinary tract 

calculi.  However, review of CT demonstrates stones only in the kidneys, which 

does not cause pain.  There are no stones in the ureters.


Patient denies any chest pain and she is very active. Her CXR was unremarkable 

and EKG showed sinus Bradycardia. She doesn't have have any hx of CHF, ischemic 

heart disease or cerebrovascular disease. Her last echo was in 2014 which was 

normal with EF 65-70%. Patient is a acceptable candidate for surgery. 





* Right upper quadrant pain


    - The etiology most likely gallbladder, given the pain got worse after 

having fatty food. 


    - CT of abdomen showed b/l nephrolithiasis, no ureteral stones, 

hydronephrosis or bowel wall thickening or obstruction. 


    - US showed small amount of gallbladder sludge, no gallstone or gallbladder 

wall thickening. Hepatomegaly demonstrating fatty change.


    - HIDA scan : No evidence for cystic duct obstruction 


    - No Leukocytosis; Normal LFTs


    - Surgery consulted - recommends urology evaluation for stones 


    - Patient was seen by Urologist and didn't recommend any intervention this 

time, agree with lap estevan. Will f/u in clinic


    - IV Fluids NSS + 20 KCl @  125 ml/hr


    - BCx NG to date


    - Scheduled lap estevan tomorrow am, NPO after midnight





* Depression/Anxiety


    - Continue Paroxetine


    - Continue Alprazolam





* Type 2 Diabetes Mellitus


    - Insulin SSI


    - q6h Accuchecks while fasting; AC/HS when eating





* Hypothyroidism


   - Continue Levothyroxine





* Diabetic Neuropathy


  - Continue Gabapentin





* GI prophylaxis


   - Protonix 40mg daily





* VTE prophylaxis


  - SCD


  - MARCIAL


  - Continue to hold pharmacological treatment given surgery tomorrow am





Code Status


  - Level I Full Code





Resident Physician Supervision Note:





I was present with Dr. Blancas during the history and exam. I discussed the case 

with the resident and agree with the findings and plan as documented in the 

note.  Any exceptions or clarifications are listed here:





66-year-old female admitted with right upper quadrant pain and history 

consistent with biliary colic.  General surgery is planned laparoscopic 

cholecystectomy tomorrow.  Her review of systems is negative from a 

cardiopulmonary standpoint; she tells me that she can push mow her grass 

without any chest pain or shortness of breath.  She had chest x-ray which was 

normal upon admission; and EKG is pending today. 





Her diabetes demonstrates excellent control with a A1c of 6.1.  Her metformin 

is on hold given her acute illness and recent use of intravenous dye.  She 

required any insulin.





She does have a very mild anemia


   Add Protonix by mouth


   Heme check stools


   Hold aspirin for now


   





Documented By:  Faraz Rosen

## 2017-08-15 VITALS
DIASTOLIC BLOOD PRESSURE: 53 MMHG | TEMPERATURE: 98.78 F | HEART RATE: 57 BPM | SYSTOLIC BLOOD PRESSURE: 136 MMHG | OXYGEN SATURATION: 92 %

## 2017-08-15 VITALS — SYSTOLIC BLOOD PRESSURE: 110 MMHG | DIASTOLIC BLOOD PRESSURE: 63 MMHG | HEART RATE: 61 BPM

## 2017-08-15 VITALS
HEART RATE: 58 BPM | DIASTOLIC BLOOD PRESSURE: 75 MMHG | TEMPERATURE: 98.78 F | SYSTOLIC BLOOD PRESSURE: 170 MMHG | OXYGEN SATURATION: 98 %

## 2017-08-15 VITALS
SYSTOLIC BLOOD PRESSURE: 118 MMHG | OXYGEN SATURATION: 97 % | DIASTOLIC BLOOD PRESSURE: 80 MMHG | TEMPERATURE: 98.6 F | HEART RATE: 55 BPM

## 2017-08-15 VITALS
SYSTOLIC BLOOD PRESSURE: 179 MMHG | DIASTOLIC BLOOD PRESSURE: 89 MMHG | HEART RATE: 58 BPM | OXYGEN SATURATION: 96 % | TEMPERATURE: 98.42 F

## 2017-08-15 VITALS — SYSTOLIC BLOOD PRESSURE: 182 MMHG | DIASTOLIC BLOOD PRESSURE: 90 MMHG

## 2017-08-15 VITALS
DIASTOLIC BLOOD PRESSURE: 71 MMHG | SYSTOLIC BLOOD PRESSURE: 168 MMHG | TEMPERATURE: 98.96 F | HEART RATE: 60 BPM | OXYGEN SATURATION: 93 %

## 2017-08-15 VITALS
DIASTOLIC BLOOD PRESSURE: 78 MMHG | OXYGEN SATURATION: 93 % | SYSTOLIC BLOOD PRESSURE: 178 MMHG | HEART RATE: 62 BPM | TEMPERATURE: 98.78 F

## 2017-08-15 VITALS — SYSTOLIC BLOOD PRESSURE: 149 MMHG | DIASTOLIC BLOOD PRESSURE: 76 MMHG

## 2017-08-15 VITALS — OXYGEN SATURATION: 97 %

## 2017-08-15 VITALS — DIASTOLIC BLOOD PRESSURE: 84 MMHG | SYSTOLIC BLOOD PRESSURE: 170 MMHG

## 2017-08-15 LAB
ALP SERPL-CCNC: 54 U/L (ref 45–117)
ALT SERPL-CCNC: 33 U/L (ref 12–78)
ANION GAP SERPL CALC-SCNC: 4 MMOL/L (ref 3–11)
AST SERPL-CCNC: 28 U/L (ref 15–37)
BUN SERPL-MCNC: 7 MG/DL (ref 7–18)
BUN/CREAT SERPL: 9.7 (ref 10–20)
CALCIUM SERPL-MCNC: 8.2 MG/DL (ref 8.5–10.1)
CHLORIDE SERPL-SCNC: 112 MMOL/L (ref 98–107)
CO2 SERPL-SCNC: 28 MMOL/L (ref 21–32)
CREAT CL PREDICTED SERPL C-G-VRATE: 75.6 ML/MIN
CREAT SERPL-MCNC: 0.73 MG/DL (ref 0.6–1.2)
EOSINOPHIL NFR BLD AUTO: 204 K/UL (ref 130–400)
GLUCOSE SERPL-MCNC: 91 MG/DL (ref 70–99)
HCT VFR BLD CALC: 33.9 % (ref 37–47)
MCH RBC QN AUTO: 27.5 PG (ref 25–34)
MCHC RBC AUTO-ENTMCNC: 31 G/DL (ref 32–36)
MCV RBC AUTO: 88.7 FL (ref 80–100)
PMV BLD AUTO: 11.5 FL (ref 7.4–10.4)
POTASSIUM SERPL-SCNC: 3.7 MMOL/L (ref 3.5–5.1)
RBC # BLD AUTO: 3.82 M/UL (ref 4.2–5.4)
SODIUM SERPL-SCNC: 144 MMOL/L (ref 136–145)
WBC # BLD AUTO: 8.6 K/UL (ref 4.8–10.8)

## 2017-08-15 RX ADMIN — HYDROMORPHONE HYDROCHLORIDE PRN MG: 1 INJECTION, SOLUTION INTRAMUSCULAR; INTRAVENOUS; SUBCUTANEOUS at 16:52

## 2017-08-15 RX ADMIN — SODIUM CHLORIDE AND POTASSIUM CHLORIDE SCH MLS/HR: 9; 1.49 INJECTION, SOLUTION INTRAVENOUS at 22:43

## 2017-08-15 RX ADMIN — INSULIN ASPART SCH UNITS: 100 INJECTION, SOLUTION INTRAVENOUS; SUBCUTANEOUS at 17:48

## 2017-08-15 RX ADMIN — POTASSIUM CITRATE SCH MEQ: 10 TABLET ORAL at 20:50

## 2017-08-15 RX ADMIN — SODIUM CHLORIDE AND POTASSIUM CHLORIDE SCH MLS/HR: 9; 1.49 INJECTION, SOLUTION INTRAVENOUS at 13:33

## 2017-08-15 RX ADMIN — ALLOPURINOL SCH MG: 100 TABLET ORAL at 09:00

## 2017-08-15 RX ADMIN — AMILORIDE HYDROCLORIDE SCH MG: 5 TABLET ORAL at 09:00

## 2017-08-15 RX ADMIN — PANTOPRAZOLE SCH MG: 40 TABLET, DELAYED RELEASE ORAL at 09:00

## 2017-08-15 RX ADMIN — ATORVASTATIN CALCIUM SCH MG: 10 TABLET, FILM COATED ORAL at 09:00

## 2017-08-15 RX ADMIN — GABAPENTIN SCH MG: 300 CAPSULE ORAL at 13:33

## 2017-08-15 RX ADMIN — GABAPENTIN SCH MG: 300 CAPSULE ORAL at 20:50

## 2017-08-15 RX ADMIN — GABAPENTIN SCH MG: 300 CAPSULE ORAL at 09:00

## 2017-08-15 RX ADMIN — PAROXETINE SCH MG: 20 TABLET, FILM COATED ORAL at 09:00

## 2017-08-15 RX ADMIN — POTASSIUM CITRATE SCH MEQ: 10 TABLET ORAL at 09:00

## 2017-08-15 RX ADMIN — SODIUM CHLORIDE AND POTASSIUM CHLORIDE SCH MLS/HR: 9; 1.49 INJECTION, SOLUTION INTRAVENOUS at 04:40

## 2017-08-15 RX ADMIN — INSULIN ASPART SCH UNITS: 100 INJECTION, SOLUTION INTRAVENOUS; SUBCUTANEOUS at 20:50

## 2017-08-15 RX ADMIN — LEVOTHYROXINE SODIUM SCH MCG: 75 TABLET ORAL at 05:26

## 2017-08-15 RX ADMIN — HYDROMORPHONE HYDROCHLORIDE PRN MG: 1 INJECTION, SOLUTION INTRAMUSCULAR; INTRAVENOUS; SUBCUTANEOUS at 16:43

## 2017-08-15 RX ADMIN — INSULIN ASPART SCH UNITS: 100 INJECTION, SOLUTION INTRAVENOUS; SUBCUTANEOUS at 12:00

## 2017-08-15 RX ADMIN — INSULIN ASPART SCH UNITS: 100 INJECTION, SOLUTION INTRAVENOUS; SUBCUTANEOUS at 06:00

## 2017-08-15 NOTE — [facility] POST OPERATIVE BRIEF NOTE
Immediate Operative Summary


Operative Date


Aug 15, 2017.





Pre-Operative Diagnosis





Chronic cholecystitis with biliary sludge





Post-Operative Diagnosis





same





Procedure(s) Performed





laparoscopic cholecystectomy





Surgeon


Dr. JENNIFER Barbour





Assistant Surgeon(s)


ANGELIA Jade PA-C





Estimated Blood Loss


15ml





Findings


See dictation





Specimens





A. gallblader (permanent)





Drains


None





Anesthesia


General





Complication(s)


None





Disposition


Recovery Room / PACU

## 2017-08-15 NOTE — [facility] OPERATIVE REPORT
Operative Report


Operative Date


Aug 15, 2017.





Pre-Operative Diagnosis





Chronic cholecystitis with biliary sludge





Post-Operative Diagnosis





same





Procedure(s) Performed





laparoscopic cholecystectomy





Surgeon


Dr. JENNIFER Barbour





Assistant Surgeon(s)


ANGELIA Jade PA-C





Estimated Blood Loss


15ml





Findings


The gallbladder was mildly dilated.  The cystic duct was not dilated.  The 

liver was a little bit firm and large but there was no nodularity to it.  The 

visible bowel appeared normal.  There were no stones within the gallbladder.  

There was a significant intrahepatic component to the gallbladder.  There were 

adhesions of the omentum to the anterior abdominal wall beneath her lower 

vertical midline incision.





Specimens





A. gallblader (permanent)





Drains


None





Anesthesia


General





Disposition


Recovery Room / PACU





Description of Procedure


The patient was given a general anesthetic and the area prepped and draped in 

the usual sterile fashion.  Because of her previous vertical midline incision 

below the umbilicus the upper midline incision was placed first.  A transverse 

incision was made carried down through the subcutaneous tissues to the fascia 

which was grasped with 2 curved clamps incised between.  The muscle was split 

the peritoneum was identified grasped and sized the introducer was placed 

bluntly.  The abdomen was insufflated to a pressure 15 mmHg with carbon 

dioxide.  The camera was passed and the midline was identified.  The anterior 

axillary introducer was placed through small skin incision under direct vision.

  The adhesions of the omentum to the anterior abdominal wall were taken down 

using sharp dissection.  A supraumbilical incision was then performed.  11 mm 

introducer was brought to there under direct vision.  The camera was then 

placed in that port and the midclavicular introducer was placed under direct 

vision through small skin incision.  Traction was placed on the gallbladder and 

the infundibulum was identified.  I had to open the peritoneum on the 

infundibulum side and then work inferiorly and then around the undersurface of 

the infundibulum and free the anterior surface of the infundibulum is 

peritoneal attachments and then opened the triangle of kilo.  Further 

dissection of flimsy connective tissue and lymphatics was performed and allowed 

me to identify the cystic duct.  I had to further skeletonize the lateral 

aspect of the cystic duct.  In doing so was able to establish a plane behind 

the cystic duct isolating in 360 and allowing me to confirm with confidence 

the cystic duct gallbladder junction.  3 clips were placed on the proximal 

cystic duct one near the junction with the gallbladder and it was divided.  

Further dissection was then carried out posterior to there.  There were 3 

tubular structures one of which was the artery into others were large 

lymphatics were clipped and divided.  The gallbladder was then peeled off the 

liver bed using electrocautery.  It was placed into an Endobag and brought out 

through the upper midline introducer site.  That introducer was replaced and 

the liver edge was elevated.  There were 2 small areas of oozing in the 

gallbladder bed of the previously controlled with cautery.  The 

subdiaphragmatic and subhepatic spaces were irrigated and irrigation was 

removed.  That was repeated until the return was clear.  The previously placed 

clips were inspected and were intact and the gallbladder bed of the liver was 

again inspected and there was no bleeding.  Gas was allowed to escape and 

removed using suction.  The fascia of the upper midline and supraumbilical 

introducer sites was closed with interrupted 0 Vicryl.  The skin of all 

incisions was closed with 4-0 Monocryl in either interrupted or running 

subcuticular fashion.  The skin was anesthetized with 0.5% Marcaine.  The skin 

was cleansed, dried and Steri-Strips applied.  Estimated blood loss 50 mL.  

Sponge needle and instrument counts were correct prior to closure.  The patient 

tolerated surgical procedure was transferred to recovery.


I attest to the content of the Intraoperative Record and any orders documented 

therein.  Any exceptions are noted below.

## 2017-08-15 NOTE — FAMILY MEDICINE PROGRESS NOTE
Progress Note


Date of Service


Aug 15, 2017.





Subjective


Pt evaluation today including:  conversation w/ patient, physical exam, lab 

review


Voiding:  no voiding problems


Patient was seen at the bedside. She still has pain on the RUQ. She describes 

the pain as "fullness". Denies acid reflex in the past. Denies nausea or 

vomiting.





   Constitutional:  No fever


   Respiratory:  No cough, No shortness of breath


   Abdomen:  + pain (RUQ pain), No nausea, No vomiting, No diarrhea, No 

constipation


   Skin:  No rash





Medications





Current Inpatient Medications








 Medications


  (Trade)  Dose


 Ordered  Sig/Khang


 Route  Start Time


 Stop Time Status Last Admin


Dose Admin


 


 Acetaminophen


  (Tylenol Tab)  650 mg  Q4H  PRN


 PO  8/13/17 05:30


 9/12/17 05:29   


 


 


 Al Hydrox/Mg


 Hydrox/Simethicone


  (Maalox Max Susp)  15 ml  Q4H  PRN


 PO  8/13/17 05:30


 9/12/17 05:29   


 


 


 Magnesium


 Hydroxide


  (Milk Of


 Magnesia Susp)  30 ml  Q6H  PRN


 PO  8/13/17 05:30


 9/12/17 05:29   


 


 


 Polyethylene


  (Miralax Powder


 Packet)  17 gm  DAILY  PRN


 PO  8/13/17 05:30


 9/12/17 05:29   


 


 


 Ondansetron HCl


  (Zofran Inj)  4 mg  Q6H  PRN


 IV  8/13/17 05:30


 9/12/17 05:29  8/13/17 06:16


4 MG


 


 Potassium


 Chloride/Sodium


 Chloride  1,000 ml @ 


 125 mls/hr  Q8H


 IV  8/13/17 06:30


 9/12/17 06:29  8/15/17 04:40


125 MLS/HR


 


 Allopurinol


  (Zyloprim Tab)  100 mg  QAM


 PO  8/13/17 09:00


 9/12/17 08:59  8/14/17 09:26


100 MG


 


 Alprazolam


  (Xanax Tab)  1 mg  BID  PRN


 PO  8/13/17 05:30


 9/12/17 05:29  8/13/17 23:07


1 MG


 


 Atorvastatin


 Calcium


  (Lipitor Tab)  10 mg  DAILY


 PO  8/13/17 09:00


 9/12/17 08:59  8/14/17 09:28


10 MG


 


 Gabapentin


  (Neurontin Cap)  300 mg  TID


 PO  8/13/17 09:00


 9/12/17 08:59  8/14/17 20:49


300 MG


 


 Levothyroxine


 Sodium


  (Synthroid Tab)  75 mcg  DAILYBB


 PO  8/13/17 06:14


 9/12/17 06:59  8/14/17 05:48


75 MCG


 


 Paroxetine HCl


  (pAXil TAB)  20 mg  QAM


 PO  8/13/17 09:00


 9/12/17 08:59  8/14/17 09:28


20 MG


 


 Potassium Citrate


  (Urocit-K Tab)  10 meq  BID


 PO  8/13/17 09:00


 9/12/17 08:59  8/14/17 20:49


10 MEQ


 


 Amiloride HCl


  (Amiloride HCl)  5 mg  QAM


 PO  8/13/17 09:00


 9/12/17 08:59  8/14/17 09:54


5 MG


 


 Miscellaneous


  (Iv Fluids


 Completed)  1 ea  PRN  PRN


 N/A  8/13/17 06:00


 8/13/18 05:59   


 


 


 Glucose


  (Glucose 40% Gel)  15-30


 GRAMS 15


 GRAMS...  UD  PRN


 PO  8/13/17 06:30


 9/12/17 06:29   


 


 


 Glucose


  (Glucose Chew


 Tab)  4-8


 Tablets 4


 Tabl...  UD  PRN


 PO  8/13/17 06:30


 9/12/17 06:29   


 


 


 Dextrose


  (Dextrose 50%


 50ML Syringe)  25-50ML OF


 50% DW IV


 FOR...  UD  PRN


 IV  8/13/17 06:30


 9/12/17 06:29   


 


 


 Glucagon


  (Glucagon Inj)  1 mg  UD  PRN


 SQ  8/13/17 06:30


 9/12/17 06:29   


 


 


 Pantoprazole


 Sodium


  (Protonix Tab)  40 mg  QAM


 PO  8/15/17 09:00


 9/14/17 08:59   


 


 


 Hydralazine HCl


  (HydrALAZINE INJ)  10 mg  Q6  PRN


 IV.  8/14/17 23:30


 9/13/17 23:29   


 


 


 Acetaminophen 650


 mg/Empty Bag  65 ml @ 


 260 mls/hr  Q6H  PRN


 IV  8/14/17 23:30


 9/13/17 23:29   


 


 


 Insulin Aspart


  (novoLOG ASPART)  **SLIDING


 SCALE**


 **G...  Q6


 SC  8/15/17 06:00


 9/14/17 05:59   


 











Objective


Vital Signs











  Date Time  Temp Pulse Resp B/P (MAP) Pulse Ox O2 Delivery O2 Flow Rate FiO2


 


8/15/17 08:43  61  110/63 (79)    


 


8/15/17 08:31     97 Room Air  


 


8/15/17 08:24    170/84 (112)    


 


8/15/17 08:01    185/90 (121)    





    182/87 (118)    


 


8/15/17 08:00 37.0 55 18 118/80 (93) 97 Room Air  


 


8/15/17 08:00      Room Air  


 


8/15/17 03:05    149/76 (100)    


 


8/14/17 23:16 36.8 58 16 170/78 (108) 97 Room Air  


 


8/14/17 19:41  56  158/93 (114)    


 


8/14/17 19:40      Room Air  


 


8/14/17 15:11 36.8 55 18 172/81 (111) 97 Room Air  











Physical Exam


General Appearance:  WD/WN, no apparent distress, + obese


Neck:  supple, trachea midline


Respiratory/Chest:  chest non-tender, lungs clear, normal breath sounds, no 

respiratory distress, no accessory muscle use


Cardiovascular:  regular rate, rhythm


Abdomen:  normal bowel sounds, soft, + tenderness (at RUQ)


Extremities:  non-tender, no pedal edema


Neurologic/Psychiatric:  alert, normal mood/affect, oriented x 3


Skin:  normal color, warm/dry, no rash





Laboratory Results





Results Past 24 Hours








Test


  8/14/17


11:47 8/14/17


17:08 8/14/17


20:48 8/15/17


04:57 Range/Units


 


 


Bedside Glucose 104 90 91  70-90  mg/dl


 


White Blood Count    8.60 4.8-10.8  K/uL


 


Red Blood Count    3.82 4.2-5.4  M/uL


 


Hemoglobin    10.5 12.0-16.0  g/dL


 


Hematocrit    33.9 37-47  %


 


Mean Corpuscular Volume    88.7   fL


 


Mean Corpuscular Hemoglobin    27.5 25-34  pg


 


Mean Corpuscular Hemoglobin


Concent 


  


  


  31.0


  32-36  g/dl


 


 


RDW Standard Deviation    47.4 36.4-46.3  fL


 


RDW Coefficient of Variation    14.6 11.5-14.5  %


 


Platelet Count    204 130-400  K/uL


 


Mean Platelet Volume    11.5 7.4-10.4  fL


 


Sodium Level    144 136-145  mmol/L


 


Potassium Level    3.7 3.5-5.1  mmol/L


 


Chloride Level    112   mmol/L


 


Carbon Dioxide Level    28 21-32  mmol/L


 


Anion Gap    4.0 3-11  mmol/L


 


Blood Urea Nitrogen    7 7-18  mg/dl


 


Creatinine


  


  


  


  0.73


  0.60-1.20


mg/dl


 


Est Creatinine Clear Calc


Drug Dose 


  


  


  75.6


   ml/min


 


 


Estimated GFR (


American) 


  


  


  98.8


   


 


 


Estimated GFR (Non-


American 


  


  


  85.2


   


 


 


BUN/Creatinine Ratio    9.7 10-20  


 


Random Glucose    91 70-99  mg/dl


 


Calcium Level    8.2 8.5-10.1  mg/dl


 


Total Bilirubin    0.4 0.2-1  mg/dl


 


Direct Bilirubin    0.1 0-0.2  mg/dl


 


Aspartate Amino Transf


(AST/SGOT) 


  


  


  28


  15-37  U/L


 


 


Alanine Aminotransferase


(ALT/SGPT) 


  


  


  33


  12-78  U/L


 


 


Alkaline Phosphatase    54   U/L


 


Total Protein    6.2 6.4-8.2  gm/dl


 


Albumin    3.3 3.4-5.0  gm/dl


 


Lipase    159   U/L


 


Test


  8/15/17


05:54 


  


  


  Range/Units


 


 


Bedside Glucose 95    70-90  mg/dl











Assessment and Plan


This is a 65 y/o female with hx of nephrolithiasis presents to the hospital 

complaining of RUQ pain for past 3 wks which got worse for past couple of days. 

Initially impression is that this may be related to history of urinary tract 

calculi.  However, review of CT demonstrates stones only in the kidneys, which 

does not cause pain.  There are no stones in the ureters.


Patient will have lap estevan today. 





* Right upper quadrant pain


    - The etiology most likely gallbladder, given the pain got worse after 

having fatty food. 


    - CT of abdomen showed b/l nephrolithiasis, no ureteral stones, 

hydronephrosis or bowel wall thickening or obstruction. 


    - US showed small amount of gallbladder sludge, no gallstone or gallbladder 

wall thickening. Hepatomegaly demonstrating fatty change.


    - HIDA scan : No evidence for cystic duct obstruction 


    - No Leukocytosis; Normal LFTs


    - Surgery consulted - recommends urology evaluation for stones 


    - Patient was seen by Urologist and didn't recommend any intervention this 

time, agree with lap estevan. Will f/u in clinic


    - IV Fluids NSS + 20 KCl @  125 ml/hr


    - BCx NG to date


    - Lipase was done on admission and it's normal (159). Will repeat lipase 

and Lfts.


    - Scheduled lap estevan today





* Mildly anemic


     - Her last Hgb was 10.5. Her hgb was 12.8 in 4/2017.


     - Will continue with protonix 40mg 


     - Order FOBT


     - Continue to hold ASA





* Depression/Anxiety


    - Continue Paroxetine


    - Continue Alprazolam





* Type 2 Diabetes Mellitus


    - Insulin SSI


    - q6h Accuchecks while fasting; AC/HS when eating


    - Last hgb was 6.1 in 4/17





* Hypothyroidism


   - Continue Levothyroxine





* Diabetic Neuropathy


  - Continue Gabapentin





* GI prophylaxis


   - Protonix 40mg daily





* VTE prophylaxis


  - SCD


  - MARCIAL


  - Continue to hold pharmacological treatment given surgery 





Code Status


  - Level I Full Code








Resident Physician Supervision Note:





I was present with Dr. Blancas during the history and exam.  I discussed the case 

with the resident and agree with the findings and plan as documented in the 

note.  Any exceptions or clarifications are listed here: Upon my exam, the 

patient has RUQ abdominal tenderness, but no rebound or guarding.  Recommend 

adding lipase and LFTs to today's labs. I also discussed the plan with general 

surgery.





Documented By:  Faraz Rosen

## 2017-08-15 NOTE — SURGERY PROGRESS NOTE
Surgery Progress Note


Date of Service


Aug 15, 2017.





Subjective


+ complaints ("not feeling well"  had abdominal pain and nausea after chicken 

broth yesterday, pain is minimal right now in right upper abdomen.), + pain 

controlled, + diet (tolerated liquids for dinner), No chest pain, No SOB, No 

nausea, No vomiting





Objective


Vital Signs:











  Date Time  Temp Pulse Resp B/P (MAP) Pulse Ox O2 Delivery O2 Flow Rate FiO2


 


8/15/17 08:43  61  110/63 (79)    


 


8/15/17 08:31     97 Room Air  


 


8/15/17 08:24    170/84 (112)    


 


8/15/17 08:01    185/90 (121)    





    182/87 (118)    


 


8/15/17 08:00 37.0 55 18 118/80 (93) 97 Room Air  


 


8/15/17 03:05    149/76 (100)    


 


8/14/17 23:16 36.8 58 16 170/78 (108) 97 Room Air  


 


8/14/17 19:41  56  158/93 (114)    


 


8/14/17 19:40      Room Air  


 


8/14/17 15:11 36.8 55 18 172/81 (111) 97 Room Air  








General Appearance:  WD/WN, no apparent distress, + obese


Head:  normocephalic, atraumatic


Respiratory/Chest:  lungs clear, normal breath sounds, no respiratory distress, 

no accessory muscle use


Cardiovascular:  regular rate, rhythm, no murmur


Abdomen:  non distended, soft, + tenderness (RUQ on light palpation, no rebound

, rigidity, or peritonitis)


Laboratory Results:





Results Past 24 Hours








Test


  8/14/17


11:47 8/14/17


17:08 8/14/17


20:48 8/15/17


04:57 Range/Units


 


 


Bedside Glucose 104 90 91  70-90  mg/dl


 


White Blood Count    8.60 4.8-10.8  K/uL


 


Red Blood Count    3.82 4.2-5.4  M/uL


 


Hemoglobin    10.5 12.0-16.0  g/dL


 


Hematocrit    33.9 37-47  %


 


Mean Corpuscular Volume    88.7   fL


 


Mean Corpuscular Hemoglobin    27.5 25-34  pg


 


Mean Corpuscular Hemoglobin


Concent 


  


  


  31.0


  32-36  g/dl


 


 


RDW Standard Deviation    47.4 36.4-46.3  fL


 


RDW Coefficient of Variation    14.6 11.5-14.5  %


 


Platelet Count    204 130-400  K/uL


 


Mean Platelet Volume    11.5 7.4-10.4  fL


 


Sodium Level    144 136-145  mmol/L


 


Potassium Level    3.7 3.5-5.1  mmol/L


 


Chloride Level    112   mmol/L


 


Carbon Dioxide Level    28 21-32  mmol/L


 


Anion Gap    4.0 3-11  mmol/L


 


Blood Urea Nitrogen    7 7-18  mg/dl


 


Creatinine


  


  


  


  0.73


  0.60-1.20


mg/dl


 


Est Creatinine Clear Calc


Drug Dose 


  


  


  75.6


   ml/min


 


 


Estimated GFR (


American) 


  


  


  98.8


   


 


 


Estimated GFR (Non-


American 


  


  


  85.2


   


 


 


BUN/Creatinine Ratio    9.7 10-20  


 


Random Glucose    91 70-99  mg/dl


 


Calcium Level    8.2 8.5-10.1  mg/dl


 


Test


  8/15/17


05:54 


  


  


  Range/Units


 


 


Bedside Glucose 95    70-90  mg/dl











Assessment & Plan


Chronic Cholecystitis with Biliary sludge


   - vitals stable


   - Pain moderate after chicken broth yesterday


   - no leukocytosis


   - EKG and CXR pre-op, spoke with Dr. Ortiz who stated she was medically 

optimized for surgery today





Plan:


continue NPO


plan for laparoscopic cholecystectomy today


continue current medical management





Dr. Torres has seen patient and agrees with above

## 2017-08-15 NOTE — ANESTHESIOLOGY PROGRESS NOTE
Anesthesia Post Op Note


Date & Time


Aug 15, 2017 at 16:36





Vital Signs


Pain Intensity:  0





Vital Signs Past 12 Hours








  Date Time  Temp Pulse Resp B/P (MAP) Pulse Ox O2 Delivery O2 Flow Rate FiO2


 


8/15/17 16:30  57 18 191/78 100 Mask 10 


 


8/15/17 16:20  56 18 194/76 100 Mask 10 


 


8/15/17 16:12 36.6 60 16 199/78 99 Mask 10 


 


8/15/17 08:43  61  110/63 (79)    


 


8/15/17 08:31     97 Room Air  


 


8/15/17 08:24    170/84 (112)    


 


8/15/17 08:01    185/90 (121)    





    182/87 (118)    


 


8/15/17 08:00 37.0 55 18 118/80 (93) 97 Room Air  


 


8/15/17 08:00      Room Air  











Notes


Mental Status:  alert / awake / arousable, participated in evaluation


Pt Amnestic to Procedure:  Yes


Nausea / Vomiting:  adequately controlled


Pain:  adequately controlled


Airway Patency, RR, SpO2:  stable & adequate


BP & HR:  stable & adequate


Hydration State:  stable & adequate


Anesthetic Complications:  no major complications apparent

## 2017-08-16 VITALS
HEART RATE: 46 BPM | DIASTOLIC BLOOD PRESSURE: 70 MMHG | SYSTOLIC BLOOD PRESSURE: 112 MMHG | TEMPERATURE: 98.24 F | OXYGEN SATURATION: 98 %

## 2017-08-16 VITALS
SYSTOLIC BLOOD PRESSURE: 155 MMHG | OXYGEN SATURATION: 97 % | DIASTOLIC BLOOD PRESSURE: 71 MMHG | TEMPERATURE: 98.06 F | HEART RATE: 54 BPM

## 2017-08-16 VITALS — OXYGEN SATURATION: 98 %

## 2017-08-16 VITALS
DIASTOLIC BLOOD PRESSURE: 70 MMHG | TEMPERATURE: 98.24 F | SYSTOLIC BLOOD PRESSURE: 112 MMHG | HEART RATE: 46 BPM | OXYGEN SATURATION: 98 %

## 2017-08-16 VITALS — HEART RATE: 56 BPM

## 2017-08-16 LAB
ANION GAP SERPL CALC-SCNC: 5 MMOL/L (ref 3–11)
BUN SERPL-MCNC: 7 MG/DL (ref 7–18)
BUN/CREAT SERPL: 9 (ref 10–20)
CALCIUM SERPL-MCNC: 8.4 MG/DL (ref 8.5–10.1)
CHLORIDE SERPL-SCNC: 110 MMOL/L (ref 98–107)
CO2 SERPL-SCNC: 27 MMOL/L (ref 21–32)
CREAT CL PREDICTED SERPL C-G-VRATE: 70.8 ML/MIN
CREAT SERPL-MCNC: 0.78 MG/DL (ref 0.6–1.2)
EOSINOPHIL NFR BLD AUTO: 213 K/UL (ref 130–400)
GLUCOSE SERPL-MCNC: 118 MG/DL (ref 70–99)
HCT VFR BLD CALC: 33.2 % (ref 37–47)
MCH RBC QN AUTO: 28.1 PG (ref 25–34)
MCHC RBC AUTO-ENTMCNC: 32.5 G/DL (ref 32–36)
MCV RBC AUTO: 86.5 FL (ref 80–100)
PMV BLD AUTO: 11.6 FL (ref 7.4–10.4)
POTASSIUM SERPL-SCNC: 4.9 MMOL/L (ref 3.5–5.1)
RBC # BLD AUTO: 3.84 M/UL (ref 4.2–5.4)
SODIUM SERPL-SCNC: 142 MMOL/L (ref 136–145)
WBC # BLD AUTO: 11.58 K/UL (ref 4.8–10.8)

## 2017-08-16 RX ADMIN — SODIUM CHLORIDE AND POTASSIUM CHLORIDE SCH MLS/HR: 9; 1.49 INJECTION, SOLUTION INTRAVENOUS at 05:55

## 2017-08-16 RX ADMIN — LEVOTHYROXINE SODIUM SCH MCG: 75 TABLET ORAL at 05:55

## 2017-08-16 RX ADMIN — INSULIN ASPART SCH UNITS: 100 INJECTION, SOLUTION INTRAVENOUS; SUBCUTANEOUS at 09:30

## 2017-08-16 RX ADMIN — ALLOPURINOL SCH MG: 100 TABLET ORAL at 09:29

## 2017-08-16 RX ADMIN — PAROXETINE SCH MG: 20 TABLET, FILM COATED ORAL at 09:29

## 2017-08-16 RX ADMIN — PANTOPRAZOLE SCH MG: 40 TABLET, DELAYED RELEASE ORAL at 09:29

## 2017-08-16 RX ADMIN — GABAPENTIN SCH MG: 300 CAPSULE ORAL at 09:29

## 2017-08-16 RX ADMIN — AMILORIDE HYDROCLORIDE SCH MG: 5 TABLET ORAL at 09:30

## 2017-08-16 RX ADMIN — ATORVASTATIN CALCIUM SCH MG: 10 TABLET, FILM COATED ORAL at 09:29

## 2017-08-16 RX ADMIN — INSULIN ASPART SCH UNITS: 100 INJECTION, SOLUTION INTRAVENOUS; SUBCUTANEOUS at 12:40

## 2017-08-16 RX ADMIN — POTASSIUM CITRATE SCH MEQ: 10 TABLET ORAL at 09:30

## 2017-08-16 NOTE — DISCHARGE SUMMARY
Discharge Summary


Date of Service


Aug 16, 2017.


 (Carmen Blancas MD)





Discharge Summary


Admission Date:


Aug 13, 2017 at 05:19


Discharge Date:  Aug 16, 2017


Discharge Disposition:  Home


Principal Diagnosis:  Chronic Cholecystitis, Biliary sludge


Immunizations:  


   Have You Had Influenza Vaccine:  No


   Influenza Vaccine Date:  Nov 5, 2012


   History of Tetanus Vaccine?:  Yes


   Tetanus Immunization Date:  Dec 5, 2002


   History of Pneumococcal:  No


   History of Hepatitis B Vaccine:  No


Procedures:





                                                                               

                                                                 


Patient Name: KAREN FAJARDO                               Unit Number:  

[id number]                  


 








 











Dictated: 08/13/17 0710


 


Transcribed: 08/13/17 0710


 


[facility]


 


Printed Date/Time: [~ rep prt dt]/[~ rep prt tm]








 [~ rep ct labl] - [~ rep ct ivnm]


 





   [facility]


 Radiology Department


 [place], [place] [address] [phone]





 











Dictated: 08/13/17 0710


 


Transcribed: 08/13/17 0710


 


[facility]


 


Printed Date/Time: [~ rep prt dt]/[~ rep prt tm]








 [~ rep ct labl] - [~ rep ct ivnm]


 





ABDOMEN AND PELVIS CT WITHOUT CONTRAST





CT DOSE: 1597.16 mGy.cm





HISTORY:      right flank pain - r/o stone





TECHNIQUE: Multiaxial CT images of the abdomen and pelvis were performed without


contrast.  A dose lowering technique was utilized adhering to the principles of


ALARA.





COMPARISON STUDY: Abdomen and pelvis CT 9/1/2013.





FINDINGS: A 3 mm nodule within the right lower lobe on image 9. No


pneumoperitoneum. No pneumatosis. No acute fractures within the visualized


osseous structures. The unenhanced liver, spleen, gallbladder, pancreas, and


adrenal glands are unremarkable. No retroperitoneal lymphadenopathy. Normal


bladder. Hysterectomy. No pelvic free fluid. Bilateral nephrolithiasis with the


largest stones measuring up to 4 mm. No ureteral stones. No hydronephrosis.


Suboptimal evaluation for bowel pathology due to the lack of intravenous and


oral contrast. However, there is no definite bowel wall thickening or


obstruction. A few colonic diverticula. The appendix appears surgically absent.





IMPRESSION: 





1. Bilateral nephrolithiasis. No ureteral stones. No hydronephrosis.


2. No bowel wall thickening or obstruction.


4. A 3 mm nodule within the right lower lobe. 











Electronically signed by:  Nilson Seth M.D.


8/13/2017 7:14 AM





Dictated Date/Time:  8/13/2017 7:10 AM





 The status of this report is Signed.   


 Draft = Not yet reviewed or approved by Radiologist.  


 Signed = Reviewed and approved by Radiologist.


<AttendingPhy>Nhi Morton M.D.</AttendingPhy> <FamilyPhy>Asaf Muse M.D.</FamilyPhy> <PrimaryPhy>Asaf Muse M.D.</PrimaryPhy> <UnitNumber>

[id number]</UnitNumber> <VisitNumber>[id number]</VisitNumber> <PatientName>

RUBEN FAJARDOA OTILIO</PatientName> <DateOfBirth>1950</DateOfBirth> <Location>C.3E</

Location> <ServiceDate>08/13/17</ServiceDate> <MNE>ESINDI</MNE> <OrderingPhy>

Rafa Modi M.D.</OrderingPhy> <OrderingPhyMNE>f rep ord dr caro</

OrderingPhyMNE> <DictatingPhyMNE>f rep dict dr caro</DictatingPhyMNE> <CCListMNE>

f rep ct mne</CCListMNE> <AdmittingPhyMNE>f pt admit dr caro</AdmittingPhyMNE> <

AttendingPhyMNE>f pt attend dr caro</AttendingPhyMNE>


<ConsultingPhyMNE>f pt consult dr caro</ConsultingPhyMNE> <FamilyPhyMNE>f pt fam 

dr caro</FamilyPhyMNE> <OtherPhyMNE>f pt other dr caro</OtherPhyMNE> <

PrimaryPhyMNE>f pt prim care dr caro</PrimaryPhyMNE> <ReferringPhyMNE>f pt 

referring dr caro</ReferringPhyMNE>




















                                                                               

                                                                 


Patient Name: KAREN FAJARDO                               Unit Number:  

[id number]                  


 








 











Dictated: 08/13/17 0810


 


Transcribed: 08/13/17 0810


 


[facility]


 


Printed Date/Time: [~ rep prt dt]/[~ rep prt tm]








 [~ rep ct labl] - [~ rep ct ivnm]


 





   [facility] [address]


 Radiology Department


 [place] [address]


 [phone]





 











Dictated: 08/13/17 0810


 


Transcribed: 08/13/17 0810


 


[facility]


 


Printed Date/Time: [~ rep prt dt]/[~ rep prt tm]








 [~ rep ct labl] - [~ rep ct ivnm]


 











Patient:  KAREN FAJARDO Address1:  [address]


 


[facility] Rec:  [id number] Address2:  


 


Acct ID:  [id number] [facility] Zip:  [place] [address]


 


YOB: 1950          Sex:  F Room/Bed:  [facility]


 


Ref Phy:  Asaf Muse M.D. SC: BINDU


 


Att Phy:  Nhi Morton M.D. Report #:  [id number]


 


[place] Phy:  Asaf Muse M.D. Test:  CXR1P


 


Admit Phy:  Herbert Dinero MD Technician:  KRISTEL


 


Interpreting Phy:  Nilson Seth MD Diagnosis:  RUQ PAIN, RUQ RIGIDITY


 


Ordering Phy:  Rafa Modi M.D. Service Date:  08/13/17


 


Admit Date: 08/13/1708/13/17 MNE: [id number]


 


 CONF:


 


 DICTATED BY: Nilson Seth M.D.]]


 


CC: Rafa Modi M.D. Pro, Jeffrey W., M.D. Singh, Madhavi, M.D.


 


 Endcc:











[~ rep ct add3]]


CHEST ONE VIEW PORTABLE





HISTORY:      cp/upper abdominal pain





COMPARISON: Chest 8/20/2016.





FINDINGS: The lungs are clear. Cardiac silhouette is normal in size. No pleural


effusions. No pneumothorax.





IMPRESSION:


No acute process.











Electronically signed by:  Nilson Seth M.D.


8/13/2017 8:11 AM





Dictated Date/Time:  8/13/2017 8:10 AM





 The status of this report is Signed.   


 Draft = Not yet reviewed or approved by Radiologist.  


 Signed = Reviewed and approved by Radiologist.


<AttendingPhy>Nhi Morton M.D.</AttendingPhy> <FamilyPhy>Asaf Muse M.D.</FamilyPhy> <PrimaryPhy>Asaf Muse M.D.</PrimaryPhy> <UnitNumber>

[id number]</UnitNumber> <VisitNumber>[id number]</VisitNumber> <PatientName>

RUBEN FAJARDOA OTILIO</PatientName> <DateOfBirth>1950</DateOfBirth> <Location>C.3E</

Location> <ServiceDate>08/13/17</ServiceDate> <MNE>ESINDI</MNE> <OrderingPhy>

Rafa Modi M.D.</OrderingPhy> <OrderingPhyMNE>f rep ord dr caro</

OrderingPhyMNE> <DictatingPhyMNE>f rep dict dr caro</DictatingPhyMNE> <CCListMNE>

f rep ct mne</CCListMNE> <AdmittingPhyMNE>f pt admit dr caro</AdmittingPhyMNE> <

AttendingPhyMNE>f pt attend dr caro</AttendingPhyMNE>


<ConsultingPhyMNE>f pt consult dr caro</ConsultingPhyMNE> <FamilyPhyMNE>f pt fam 

dr caro</FamilyPhyMNE> <OtherPhyMNE>f pt other dr caro</OtherPhyMNE> <

PrimaryPhyMNE>f pt prim care dr caro</PrimaryPhyMNE> <ReferringPhyMNE>f pt 

referring dr caro</ReferringPhyMNE>




















                                                                               

                                                                 


Patient Name: KAREN FAJARDO                               Unit Number:  

[id number]                  


 








 











Dictated: 08/13/17 0828


 


Transcribed: 08/13/17 0828


 


curated.by


 


Printed Date/Time: [~ rep prt dt]/[~ rep prt tm]








 [~ rep ct labl] - [~ rep ct ivnm]


 





   [facility]


 Radiology Department


 [place] [address] [phone]





 











Dictated: 08/13/17 0828


 


Transcribed: 08/13/17 0828


 


curated.by


 


Printed Date/Time: [~ rep prt dt]/[~ rep prt tm]








 [~ rep ct labl] - [~ rep ct ivnm]


 





ABDOMINAL ULTRASOUND, RIGHT UPPER QUADRANT





HISTORY:      Pt c/o RUQ abd pain.





COMPARISON:  None.





FINDINGS:





Pancreas: The pancreatic tail is obscured by overlying bowel gas. The remaining


portions of the pancreas are within normal limits.





Liver: The liver is echogenic consistent with fatty change. The liver is


enlarged measuring 23 cm in length.





Gallbladder: No gallbladder wall thickening. No gallstones. Small amount of


gallbladder sludge.





CBD: 4 mm.





Right kidney: No hydronephrosis.





IMPRESSION: 





1. Small amount of gallbladder sludge. No gallstones. No gallbladder wall


thickening.


2. Hepatomegaly demonstrating fatty change.











Electronically signed by:  Nilson Seth M.D.


8/13/2017 8:30 AM





Dictated Date/Time:  8/13/2017 8:28 AM





 The status of this report is Signed.   


 Draft = Not yet reviewed or approved by Radiologist.  


 Signed = Reviewed and approved by Radiologist.


<AttendingPhy>Nhi Morton M.D.</AttendingPhy> <FamilyPhy>Asaf Muse M.D.</FamilyPhy> <PrimaryPhy>Asaf Muse M.D.</PrimaryPhy> <UnitNumber>

[id number]</UnitNumber> <VisitNumber>[id number]</VisitNumber> <PatientName>

KAREN FAJARDO</PatientName> <DateOfBirth>1950</DateOfBirth> <Location>C.3E</

Location> <ServiceDate>08/13/17</ServiceDate> <MNE>ESINDI</MNE> <OrderingPhy>

Twin Juarez MD</OrderingPhy> <OrderingPhyMNE>f rep ord dr caro</

OrderingPhyMNE> <DictatingPhyMNE>f rep dict dr caro</DictatingPhyMNE> <CCListMNE>

f rep ct vania</CCListMNE> <AdmittingPhyMNE>f pt admit dr caro</AdmittingPhyMNE> <

AttendingPhyMNE>f pt attend dr caro</AttendingPhyMNE>


<ConsultingPhyMNE>f pt consult dr caro</ConsultingPhyMNE> <FamilyPhyMNE>f pt fam 

dr caro</FamilyPhyMNE> <OtherPhyMNE>f pt other dr caro</OtherPhyMNE> <

PrimaryPhyMNE>f pt prim care dr caro</PrimaryPhyMNE> <ReferringPhyMNE>f pt 

referring dr caro</ReferringPhyMNE>





























                                                                               

                                                                 


Patient Name: KAREN FAJARDO                               Unit Number:  

[id number]                  


 








 











Dictated: 08/13/17 1412


 


Transcribed: 08/13/17 1412


 


[facility]


 


Printed Date/Time: [~ rep prt dt]/[~ rep prt tm]








 [~ rep ct labl] - [~ rep ct ivnm]


 





   [facility]


 Radiology Department


 [place] [address] [phone]





 











Dictated: 08/13/17 1412


 


Transcribed: 08/13/17 1412


 


[facility]


 


Printed Date/Time: [~ rep prt dt]/[~ rep prt tm]








 [~ rep ct labl] - [~ rep ct ivnm]


 





NUCLEAR MEDICINE HEPATOBILIARY SCAN 





HISTORY:      RUQ pain ?Biliary Akinesia?





COMPARISON:  Abdominal ultrasound 8/13/2017.





TECHNIQUE: Immediately following the intravenous administration of 5.5 mCi


Tc-99m Choletec, dynamic anterior abdominal imaging was performed.


 


FINDINGS:


Uniform hepatic tracer accumulation is shown. Prompt intrahepatic biliary


excretion is seen. The gallbladder, common bile duct, and small bowel are all


visualized by 35 minutes. This appearance represents the normal sequence of


biliary excretion.





IMPRESSION:  


1. No evidence for cystic duct obstruction.














Electronically signed by:  Nilson Seth M.D.


8/13/2017 2:13 PM





Dictated Date/Time:  8/13/2017 2:12 PM





 The status of this report is Signed.   


 Draft = Not yet reviewed or approved by Radiologist.  


 Signed = Reviewed and approved by Radiologist.


<AttendingPhy>Nhi Morton M.D.</AttendingPhy> <FamilyPhy>Asaf Muse M.D.</FamilyPhy> <PrimaryPhy>Asaf Muse M.D.</PrimaryPhy> <UnitNumber>

[id number]</UnitNumber> <VisitNumber>[id number]</VisitNumber> <PatientName>

KAREN FAJARDO</PatientName> <DateOfBirth>1950</DateOfBirth> <Location>C.3E</

Location> <ServiceDate>08/13/17</ServiceDate> <MNE>ESINDI</MNE> <OrderingPhy>

Carmen Blancas MD</OrderingPhy> <OrderingPhyMNE>f rep ord dr caro</OrderingPhyMNE

> <DictatingPhyMNE>f rep dict dr caro</DictatingPhyMNE> <CCListMNE>f rep ct mne</

CCListMNE> <AdmittingPhyMNE>f pt admit dr caro</AdmittingPhyMNE> <AttendingPhyMNE

>f pt attend dr caro</AttendingPhyMNE>


<ConsultingPhyMNE>f pt consult dr caro</ConsultingPhyMNE> <FamilyPhyMNE>f pt fam 

dr caro</FamilyPhyMNE> <OtherPhyMNE>f pt other dr caro</OtherPhyMNE> <

PrimaryPhyMNE>f pt prim care dr caro</PrimaryPhyMNE> <ReferringPhyMNE>f pt 

referring dr caro</ReferringPhyMNE>


 (Carmen Blancas ., MD)





Medication Reconciliation


New Medications:  


Oxycodone/Acetaminophen 5MG/325MG (Percocet 5MG/325MG)  Tab


1-2 TABLETS PO Q4H PRN for Pain, #30 TAB


PAIN


 


Continued Medications:  


Allopurinol (Zyloprim) 100 Mg Tab


100 MG PO QAM, TAB





Alprazolam (Xanax) 1 Mg Tab


1 MG PO BID PRN for Anxiety





Amiloride Hcl (Amiloride Hcl) 5 Mg Tab


5 MG PO QAM





Aspirin (Aspirin Ec) 81 Mg Tab


81 MG PO DAILY





Atorvastatin (Lipitor) 10 Mg Tab


10 MG PO DAILY, TAB





Cyanocobalamin (B-12) 1,000 Mcg Cap


1000 MCG PO QAM





Gabapentin (Gabapentin) 300 Mg Cap


300 MG PO TID, #270





Levothyroxine Sodium (Levothyroxine Sodium) 75 Mcg Tab


75 MCG PO QAM, TAB 3 Refills





Magnesium Oxide (Mg Supplement (Magnesium) 400 Mg Cap


400 MG PO NOON





Metformin HCl (Metformin HCl) 500 Mg Tab


500 MG PO TID





Omega-3 Fatty Acids (Fish Oil 1200 mg) 1 Cap Cap


1200 MG PO TID





Paroxetine (Paxil) 20 Mg Tab


20 MG PO QAM, TAB





Potassium Citrate (Alkalinizer (Potassium Citrate ER) 1,080 Mg Tab


2 TAB PO BID, 3 Refills











Discharge Exam


Patient was seen at the bedside. She was ambulation to the bathroom without any 

discomfort. Has some discomfort at the incision site. Denies abdominal pain, 

nausea, vomiting, SOB or chest pain.


Review of Systems:  


   Constitutional:  No fever


   Respiratory:  No cough, No shortness of breath


   Abdomen:  No pain, No nausea, No vomiting, No diarrhea, No constipation, No 

GI bleeding


   Genitourinary - Male:  No dysuria


   Integumentary:  No rash


Physical Exam:  


   General Appearance:  WD/WN, no apparent distress, + obese


   Neck:  supple, trachea midline


   Respiratory/Chest:  chest non-tender, lungs clear, normal breath sounds, no 

respiratory distress, no accessory muscle use


   Cardiovascular:  regular rate, rhythm


   Abdomen / GI:  normal bowel sounds, soft, + pertinent finding (incision area 

is clean, intact and dry)


   Extremities:  no calf tenderness, no pedal edema, non-tender


   Neurologic/Psychiatric:  alert, normal mood/affect, oriented x 3


   Skin:  normal color, warm/dry, no rash


 (Carmen Blancas MD)





Hospital Course


This is a 66 year old female with hx of nephrolithiasis presents to the 

hospital with acute on chronic abdominal pain. She states that the pain is 

located at the RUQ, constant, 8/10, radiates to the back and eating aggravates 

the pain. She doesn't think any thing alleviates the pain. She states that she 

has chronic abdominal pain for kidney stone but this doesn't feel similar to 

that pain. The pain got worse after she having cheese steak. She denies any 

fevers, chills, sweats dysuria, urinary frequency.  


She decided to come to the hospital because the pain was more persistent than 

it usually is.  She also states that she has been busy for the past 3 weeks and 

did not have the time to come to the ED to be further evaluated.


In ED CT of abdomen showed stones only on the kidney; there was no stones noted 

on the ureters, which doesn't cause pain. Lipase was normal. Patient was 

admitted to the med/surg for further management. 





* Right upper quadrant pain


    - US showed small amount of gallbladder sludge, no gallstone or gallbladder 

wall thickening. HIDA scan showed no evidence for cystic duct obstruction. 

General surgery was consulted. They recommended laparoscopy cholecystectomy. 

Urology was also consulted and they didn't think the pain was from kidney 

stones. BCx was negative. Patient tolerated the surgery well. Sludge was noted 

on the gladder. Patient pain improved after the surgery. She denied any 

abdominal pain during discharge, only mild discomfort at the incision area. 

Pain medication was prescribed and f/u with surgery in 2 wks. Verbal and 

written instruction was given to the patient.  





* Mildly anemic


     - On admission patient was mildly anemic, Hgb in 10s. Her hgb was 12.8 in 4 /2017. Protonix was given during her stay in hospital. Recommended to f/u CBC 

with primary doctor.


Total Time Spent:  Greater than 30 minutes


This includes examination of the patient, discharge planning, medication 

reconciliation, and communication with other providers.


 (Carmen Blancas MD)


Resident Physician Supervision Note:





I was present with Dr. Blancas during the history and exam. I discussed the case 

with the resident and agree with the findings and plan as documented in the 

note.  The patient was OOB this morning in the chair c/o only mild soreness in 

the RUQ. She tolerated dinner and breakfast without nausea or emesis. Discussed 

follow up - she already has an appointment with PCP and GI scheduled. 





Documented By:  Faraz Rosen


Total Time Spent:  Less than 30 minutes


 (Faraz Rosen,D.O.)


Discharge Instructions


Please refer to the electronic Patient Visit Report (Discharge Instructions) 

for additional information.


 (Carmen Blancas MD)





Additional Copies To


Asaf Muse M.D.

## 2017-08-16 NOTE — DISCHARGE INSTRUCTIONS
Discharge Instructions


Date of Service


Aug 16, 2017.





Admission


Reason for Admission:  Ruq Pain, Ruq Rigidity





Discharge


Discharge Diagnosis / Problem:  Chronic cholecystitis





Discharge Goals


Goal(s):  Decrease discomfort, Increase independence, Improve disease control





Activity Recommendations


Activity Limitations:  per Instructions/Follow-up section


No heavy weight lifting, no more than 10lbs for next 2 weeks


.





Instructions / Follow-Up


Instructions / Follow-Up


You presented to the clinic because of abdominal pain.


Imaging of your abdomen and lab works were done. All the lab works were normal. 

Imaging of your gallbladder suspected for cholecystitis.


Surgery was consulted and gallbladder was removed. 


Please take the pain medication as needed. Don't drive when take pain 

medications.  


Resume all your home medication.


Please follow the instruction given by surgery.


No heavy weight lifting, no more than 10lbs for next 2 weeks 


Follow up with your primary doctor in 1 week and surgery in 2weeks





Current Hospital Diet


Patient's current hospital diet: Diabetes Type 2 Diet





Discharge Diet


Recommended Diet:  Regular Diet





Procedures


Procedures Performed:  


laparoscopic cholecystectomy





Pending Studies


Studies pending at discharge:  no





Medical Emergencies








.


Who to Call and When:





Medical Emergencies:  If at any time you feel your situation is an emergency, 

please call 911 immediately.





.





Non-Emergent Contact


Non-Emergency issues call your:  Primary Care Provider





.


.








"Provider Documentation" section prepared by Carmen Blancas.








.





VTE Core Measure


Inpt VTE Proph given/why not?:  SCD's, Contraindicated (hold in case patient 

for surgery)

## 2017-08-16 NOTE — DISCHARGE INSTRUCTIONS
Discharge Instructions


Date of Service


Aug 16, 2017.





Admission


Reason for Admission:  Ruq Pain, Ruq Rigidity





Discharge


Discharge Diagnosis / Problem:  Chronic Cholecystitis, Biliary sludge





Discharge Goals


Goal(s):  Decrease discomfort, Improve function





Activity Recommendations


Activity Limitations:  as noted below


No heavy lifting over 20 pounds for 2 weeks


No strenuous activity until cleared by surgeon


No submerging incisions underwater for 2 weeks (swimming, bathing, or hot tubs)


No driving while taking narcotic pain medication or until you are pain free





Walking and light activity is encouraged.


.





Instructions / Follow-Up


Instructions / Follow-Up


You may shower when you get home


Leave steri strips on incisions for 7 days and then remove, they may fall off 

on their own that is okay


Follow-up with Dr. Barbour or Marisabel Jade PA-C in 2 weeks, please call 

office at [phone] to make an appointment.





Current Hospital Diet


Patient's current hospital diet: Diabetes Type 2 Diet





Discharge Diet


Recommended Diet:  Regular Diet





Procedures


Procedures Performed:  


laparoscopic cholecystectomy





Pending Studies


Studies pending at discharge:  yes


List of pending studies:  


gallbladder pathology- will be reviewed at follow-up visit





Medical Emergencies








.


Who to Call and When:





Medical Emergencies:  If at any time you feel your situation is an emergency, 

please call 911 immediately.





.





Non-Emergent Contact


Non-Emergency issues call your:  Primary Care Provider, Surgeon


Call Non-Emergent contact if:  you have a fever, temperature is above 101.5, 

your pain is not controlled, your pain is worsening, wound has increased 

drainage, wound has increased redness, wound has increased pain


.








"Provider Documentation" section prepared by Marisabel Jade.








.





VTE Core Measure


Inpt VTE Proph given/why not?:  SCD's, Contraindicated (hold in case patient 

for surgery)





[place] Drug Monitoring Program


Search Results:  patient reviewed within database, no issues identified

## 2017-08-16 NOTE — SURGERY PROGRESS NOTE
Surgery Progress Note


Date of Service


Aug 16, 2017.





Subjective


Post OP Day:  1


Patient examined at bedside this morning.  Afebrile, vitals stable on room air 

overnight, no acute events.  Pain is well controlled this morning.  Took 

percocet last night before bed with good pain relief.  Tolerating regular diet 

without N/V.  Ambulating and voiding without difficulty.  Passing flatus, no BM 

yet.  Wants to go home.





Objective


Vital Signs:











  Date Time  Temp Pulse Resp B/P (MAP) Pulse Ox O2 Delivery O2 Flow Rate FiO2


 


8/16/17 08:03 36.8 46 14 112/70 (84) 98 Room Air  


 


8/16/17 07:40      Room Air  


 


8/16/17 03:19 36.7 54 16 155/71 (99) 97 Room Air  


 


8/15/17 23:40      Room Air  


 


8/15/17 23:00 37.1 57 16 136/53 (80) 92 Room Air  


 


8/15/17 20:30 37.1 62 18 178/78 (111) 93 Room Air  


 


8/15/17 18:30 36.9 58 16 179/89 (119) 96 Nasal Cannula 2.0 


 


8/15/17 18:00 37.1 58 18 170/75 (106) 98 Nasal Cannula 2.0 


 


8/15/17 17:30 37.2 60 17 168/71 (103) 93 Nasal Cannula 2.0 


 


8/15/17 17:30     93 Nasal Cannula 2.0 


 


8/15/17 17:30     93 Nasal Cannula 2.0 


 


8/15/17 17:10 36.6 57 20 195/78 99 Nasal Cannula 2 


 


8/15/17 17:00  57 18 177/70 99 Nasal Cannula 4 


 


8/15/17 16:50  53 18 199/97 99 Nasal Cannula 4 


 


8/15/17 16:40  54 18 179/73 99 Nasal Cannula 4 


 


8/15/17 16:30  57 18 191/78 100 Mask 10 


 


8/15/17 16:20  56 18 194/76 100 Mask 10 


 


8/15/17 16:12 36.6 60 16 199/78 99 Mask 10 








General Appearance:  WD/WN, no apparent distress


Head:  normocephalic, atraumatic


Neck:  supple


Respiratory/Chest:  lungs clear, normal breath sounds


Cardiovascular:  regular rate, rhythm


Abdomen:  normal bowel sounds, non tender, non distended, soft


Incision(s):  clean, dry, intact


Laboratory Results:





Results Past 24 Hours








Test


  8/15/17


12:00 8/15/17


16:24 8/15/17


17:45 8/15/17


20:45 Range/Units


 


 


Bedside Glucose 114 140 139 174 70-90  mg/dl


 


Test


  8/16/17


05:03 8/16/17


08:02 


  


  Range/Units


 


 


White Blood Count 11.58    4.8-10.8  K/uL


 


Red Blood Count 3.84    4.2-5.4  M/uL


 


Hemoglobin 10.8    12.0-16.0  g/dL


 


Hematocrit 33.2    37-47  %


 


Mean Corpuscular Volume 86.5      fL


 


Mean Corpuscular Hemoglobin 28.1    25-34  pg


 


Mean Corpuscular Hemoglobin


Concent 32.5


  


  


  


  32-36  g/dl


 


 


RDW Standard Deviation 44.9    36.4-46.3  fL


 


RDW Coefficient of Variation 14.4    11.5-14.5  %


 


Platelet Count 213    130-400  K/uL


 


Mean Platelet Volume 11.6    7.4-10.4  fL


 


Sodium Level 142    136-145  mmol/L


 


Potassium Level 4.9    3.5-5.1  mmol/L


 


Chloride Level 110      mmol/L


 


Carbon Dioxide Level 27    21-32  mmol/L


 


Anion Gap 5.0    3-11  mmol/L


 


Blood Urea Nitrogen 7    7-18  mg/dl


 


Creatinine


  0.78


  


  


  


  0.60-1.20


mg/dl


 


Est Creatinine Clear Calc


Drug Dose 70.8


  


  


  


   ml/min


 


 


Estimated GFR (


American) 91.2


  


  


  


   


 


 


Estimated GFR (Non-


American 78.7


  


  


  


   


 


 


BUN/Creatinine Ratio 9.0    10-20  


 


Random Glucose 118    70-99  mg/dl


 


Calcium Level 8.4    8.5-10.1  mg/dl


 


Bedside Glucose  111   70-90  mg/dl











Assessment & Plan


Mabel Gordon is a 66 year old woman with chronic cholecystitis with biliary sludge 

who is now POD 1 s/p lap estevan.  There were no complications, patient is doing 

well post operatively.





Plan:


-Diet as tolerated


-Pain control as needed


-Encouraged ambulation


-OK for discharge home today.  Instructions discussed with patient and placed 

in written instructions


-Follow up in clinic for post op visit in approximately 2 weeks


-Discussed with primary team





Shasha Gerardo MD


8/16/17

## 2017-09-11 ENCOUNTER — HOSPITAL ENCOUNTER (OUTPATIENT)
Dept: HOSPITAL 45 - C.ULTR | Age: 67
Discharge: HOME | End: 2017-09-11
Attending: UROLOGY
Payer: COMMERCIAL

## 2017-09-11 DIAGNOSIS — N20.0: Primary | ICD-10-CM

## 2017-09-11 NOTE — DIAGNOSTIC IMAGING REPORT
RENAL ULTRASOUND



CLINICAL HISTORY: Kidney stones.    



COMPARISON STUDY:  CT of the abdomen and pelvis August 13, 2017 and KUB

September 11, 2017.



TECHNIQUE:  Sonography of the kidneys and the urinary bladder was performed.



FINDINGS: The right kidney measures 11.8 x 5.8 x 6.6 cm and the left measures

12.1 x 6.8 x 6.1 cm. There is no hydronephrosis. Bilateral renal calculi measure

up to 5 mm. The right ureteral jet was not identified. There is moderate renal

cortical thinning.



IMPRESSION: 



1. No hydronephrosis.



2. Bilateral nephrolithiasis.







Electronically signed by:  Stan Steinberg M.D.

9/11/2017 1:39 PM



Dictated Date/Time:  9/11/2017 1:37 PM

## 2017-09-11 NOTE — DIAGNOSTIC IMAGING REPORT
KUB



CLINICAL HISTORY: KIDNEY STONE    



COMPARISON STUDY:  6/12/2017 



FINDINGS: There is no pathologic bowel dilatation. There are surgical clips the

right upper quadrant consistent with a prior cholecystectomy. There are

suspected tiny bilateral renal calculi. Pelvic basin calcifications remain

similar to the prior study and likely represent phleboliths. The left-sided

double pigtail left ureteral stent has been removed..



IMPRESSION:  

1. No evidence of pathologic bowel dilatation

2. Equivocal punctate bilateral renal calculi

3. Interval removal of the left-sided double pigtail nephroureteral stent 







Electronically signed by:  Kleber Alexandre M.D.

9/11/2017 12:59 PM



Dictated Date/Time:  9/11/2017 12:58 PM

## 2017-10-25 ENCOUNTER — HOSPITAL ENCOUNTER (OUTPATIENT)
Dept: HOSPITAL 45 - C.LAB1850 | Age: 67
Discharge: HOME | End: 2017-10-25
Attending: INTERNAL MEDICINE
Payer: COMMERCIAL

## 2017-10-25 DIAGNOSIS — E11.9: Primary | ICD-10-CM

## 2017-10-25 DIAGNOSIS — E03.9: ICD-10-CM

## 2017-10-25 DIAGNOSIS — N20.0: ICD-10-CM

## 2017-10-25 DIAGNOSIS — E78.00: ICD-10-CM

## 2017-10-25 LAB
ALT SERPL-CCNC: 35 U/L (ref 12–78)
ANION GAP SERPL CALC-SCNC: 7 MMOL/L (ref 3–11)
AST SERPL-CCNC: 29 U/L (ref 15–37)
BUN SERPL-MCNC: 11 MG/DL (ref 7–18)
BUN/CREAT SERPL: 11.4 (ref 10–20)
CALCIUM SERPL-MCNC: 9.5 MG/DL (ref 8.5–10.1)
CHLORIDE SERPL-SCNC: 104 MMOL/L (ref 98–107)
CHOLEST/HDLC SERPL: 3.1 {RATIO}
CO2 SERPL-SCNC: 29 MMOL/L (ref 21–32)
CREAT SERPL-MCNC: 0.94 MG/DL (ref 0.6–1.2)
EOSINOPHIL NFR BLD AUTO: 213 K/UL (ref 130–400)
GLUCOSE SERPL-MCNC: 138 MG/DL (ref 70–99)
GLUCOSE UR QL: 55 MG/DL
HCT VFR BLD CALC: 36.7 % (ref 37–47)
KETONES UR QL STRIP: 64 MG/DL
MCH RBC QN AUTO: 29 PG (ref 25–34)
MCHC RBC AUTO-ENTMCNC: 32.4 G/DL (ref 32–36)
MCV RBC AUTO: 89.3 FL (ref 80–100)
NITRITE UR QL STRIP: 256 MG/DL (ref 0–150)
PH UR: 170 MG/DL (ref 0–200)
PMV BLD AUTO: 11.8 FL (ref 7.4–10.4)
POTASSIUM SERPL-SCNC: 4.2 MMOL/L (ref 3.5–5.1)
RBC # BLD AUTO: 4.11 M/UL (ref 4.2–5.4)
SODIUM SERPL-SCNC: 141 MMOL/L (ref 136–145)
TSH SERPL-ACNC: 0.62 UIU/ML (ref 0.3–4.5)
VERY LOW DENSITY LIPOPROT CALC: 51 MG/DL
WBC # BLD AUTO: 7.37 K/UL (ref 4.8–10.8)

## 2017-10-26 LAB — EST. AVERAGE GLUCOSE BLD GHB EST-MCNC: 131 MG/DL

## 2018-02-01 ENCOUNTER — HOSPITAL ENCOUNTER (OUTPATIENT)
Dept: HOSPITAL 45 - C.LAB1850 | Age: 68
Discharge: HOME | End: 2018-02-01
Attending: INTERNAL MEDICINE
Payer: COMMERCIAL

## 2018-02-01 DIAGNOSIS — E03.9: Primary | ICD-10-CM

## 2018-02-01 DIAGNOSIS — L29.9: ICD-10-CM

## 2018-02-01 DIAGNOSIS — R53.83: ICD-10-CM

## 2018-02-01 DIAGNOSIS — E83.42: ICD-10-CM

## 2018-02-01 LAB
ALBUMIN SERPL-MCNC: 3.8 GM/DL (ref 3.4–5)
ALP SERPL-CCNC: 76 U/L (ref 45–117)
ALT SERPL-CCNC: 40 U/L (ref 12–78)
AST SERPL-CCNC: 34 U/L (ref 15–37)
BUN SERPL-MCNC: 10 MG/DL (ref 7–18)
CALCIUM SERPL-MCNC: 8.7 MG/DL (ref 8.5–10.1)
CO2 SERPL-SCNC: 26 MMOL/L (ref 21–32)
CREAT SERPL-MCNC: 0.85 MG/DL (ref 0.6–1.2)
EOSINOPHIL NFR BLD AUTO: 217 K/UL (ref 130–400)
GLUCOSE SERPL-MCNC: 120 MG/DL (ref 70–99)
HCT VFR BLD CALC: 38 % (ref 37–47)
HGB BLD-MCNC: 12.2 G/DL (ref 12–16)
MCH RBC QN AUTO: 29 PG (ref 25–34)
MCHC RBC AUTO-ENTMCNC: 32.1 G/DL (ref 32–36)
MCV RBC AUTO: 90.5 FL (ref 80–100)
PMV BLD AUTO: 12 FL (ref 7.4–10.4)
POTASSIUM SERPL-SCNC: 3.8 MMOL/L (ref 3.5–5.1)
PROT SERPL-MCNC: 7.2 GM/DL (ref 6.4–8.2)
RED CELL DISTRIBUTION WIDTH CV: 14.7 % (ref 11.5–14.5)
RED CELL DISTRIBUTION WIDTH SD: 49 FL (ref 36.4–46.3)
SODIUM SERPL-SCNC: 138 MMOL/L (ref 136–145)
WBC # BLD AUTO: 7.2 K/UL (ref 4.8–10.8)

## 2018-04-23 ENCOUNTER — HOSPITAL ENCOUNTER (OUTPATIENT)
Dept: HOSPITAL 45 - C.LAB | Age: 68
Discharge: HOME | End: 2018-04-23
Attending: INTERNAL MEDICINE
Payer: COMMERCIAL

## 2018-04-23 DIAGNOSIS — E53.8: ICD-10-CM

## 2018-04-23 DIAGNOSIS — M10.9: ICD-10-CM

## 2018-04-23 DIAGNOSIS — E78.00: ICD-10-CM

## 2018-04-23 DIAGNOSIS — D72.819: Primary | ICD-10-CM

## 2018-04-23 DIAGNOSIS — E11.9: ICD-10-CM

## 2018-04-23 DIAGNOSIS — E03.9: ICD-10-CM

## 2018-04-23 LAB
ALT SERPL-CCNC: 39 U/L (ref 12–78)
AST SERPL-CCNC: 29 U/L (ref 15–37)
BUN SERPL-MCNC: 9 MG/DL (ref 7–18)
CALCIUM SERPL-MCNC: 9.3 MG/DL (ref 8.5–10.1)
CO2 SERPL-SCNC: 25 MMOL/L (ref 21–32)
CREAT SERPL-MCNC: 0.91 MG/DL (ref 0.6–1.2)
EOSINOPHIL NFR BLD AUTO: 214 K/UL (ref 130–400)
GLUCOSE SERPL-MCNC: 158 MG/DL (ref 70–99)
HBA1C MFR BLD: 6.5 % (ref 4.5–5.6)
HCT VFR BLD CALC: 38.7 % (ref 37–47)
HGB BLD-MCNC: 12.7 G/DL (ref 12–16)
KETONES UR QL STRIP: 60 MG/DL
MCH RBC QN AUTO: 29.7 PG (ref 25–34)
MCHC RBC AUTO-ENTMCNC: 32.8 G/DL (ref 32–36)
MCV RBC AUTO: 90.4 FL (ref 80–100)
PH UR: 152 MG/DL (ref 0–200)
PMV BLD AUTO: 12.2 FL (ref 7.4–10.4)
POTASSIUM SERPL-SCNC: 3.8 MMOL/L (ref 3.5–5.1)
RED CELL DISTRIBUTION WIDTH CV: 14.5 % (ref 11.5–14.5)
RED CELL DISTRIBUTION WIDTH SD: 47.6 FL (ref 36.4–46.3)
SODIUM SERPL-SCNC: 138 MMOL/L (ref 136–145)
URATE SERPL-MCNC: 2.3 MG/DL (ref 2.6–7.2)
WBC # BLD AUTO: 6.95 K/UL (ref 4.8–10.8)

## 2018-04-27 ENCOUNTER — HOSPITAL ENCOUNTER (OUTPATIENT)
Dept: HOSPITAL 45 - C.ULTR | Age: 68
Discharge: HOME | End: 2018-04-27
Attending: INTERNAL MEDICINE
Payer: COMMERCIAL

## 2018-04-27 DIAGNOSIS — D17.1: Primary | ICD-10-CM

## 2018-04-27 NOTE — DIAGNOSTIC IMAGING REPORT
LEFT LATERAL CHEST ULTRASOUND



CLINICAL HISTORY: D17.1 Lipoma of torso left sided nodule below arm left side.

Left lateral chest pain.



COMPARISON STUDY:  None.



FINDINGS: Real-time sonographic imaging of the left lateral chest was performed

at the patient's area of interest. No masses or fluid collections identified.



IMPRESSION:  No significant abnormality within the left lateral chest at the

patient's area of interest. 





 







Electronically signed by:  Nilson Seth M.D.

4/27/2018 1:38 PM



Dictated Date/Time:  4/27/2018 1:24 PM

## 2018-05-24 ENCOUNTER — HOSPITAL ENCOUNTER (OUTPATIENT)
Dept: HOSPITAL 45 - C.RAD | Age: 68
Discharge: HOME | End: 2018-05-24
Attending: UROLOGY
Payer: COMMERCIAL

## 2018-05-24 DIAGNOSIS — R32: Primary | ICD-10-CM

## 2018-05-24 NOTE — DIAGNOSTIC IMAGING REPORT
KUB



HISTORY:      R32 Urinary [id number]



COMPARISON: [facility] 9/11/2017.



FINDINGS: The bowel gas pattern is non-obstructive. Moderate stool volume

throughout the colon suggests constipation. Cholecystectomy clips are noted.

Multiple calcifications of the pelvis suggest phleboliths. There is no

organomegaly.  Renal shadows are partially obscured by bowel gas. No definite

nephrolithiasis or ureteral calculi No pneumoperitoneum or pneumatosis. No

fracture. Degenerative changes of the spine, hips and pelvis.



IMPRESSION:  



1. Nonobstructive bowel gas pattern.

2. No definite nephrolithiasis or ureteral calculi.

3. Suggested constipation.







Electronically signed by:  Magdaleno Alvarado M.D.

5/24/2018 11:34 AM



Dictated Date/Time:  5/24/2018 11:32 AM

## 2018-07-19 ENCOUNTER — HOSPITAL ENCOUNTER (OUTPATIENT)
Dept: HOSPITAL 45 - C.RAD | Age: 68
Discharge: HOME | End: 2018-07-19
Attending: NURSE PRACTITIONER
Payer: COMMERCIAL

## 2018-07-19 DIAGNOSIS — Z12.4: Primary | ICD-10-CM

## 2018-07-19 DIAGNOSIS — T17.890A: ICD-10-CM
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
## 2018-07-19 NOTE — DIAGNOSTIC IMAGING REPORT
SOFT TISSUE NECK



HISTORY:  67 years-old Female Z01.419 Encounter for routine gynecological

examination acute choking sensation with history of aspiration



COMPARISON: Chest radiograph of same day



TECHNIQUE: 2 views of the soft tissues of the neck



FINDINGS: 

There is no prevertebral soft tissue swelling. Epiglottis and aryepiglottic

folds appear unremarkable. No radiopaque foreign body within the airway. Mild

intervertebral disc space narrowing with spondylitic spurring at C6-C7. Mild

multilevel facet arthrosis. Imaged lung apices appear clear.



IMPRESSION: Unremarkable appearance of the soft tissues of the neck. 







The above report was generated using voice recognition software. It may contain

grammatical, syntax or spelling errors.







Electronically signed by:  Magdaleno Alvarado M.D.

7/19/2018 5:47 PM



Dictated Date/Time:  7/19/2018 5:45 PM

## 2018-07-19 NOTE — DIAGNOSTIC IMAGING REPORT
CHEST-PA,LAT AND OBLIQUE VIEWS



CLINICAL HISTORY: T17.890A Aspiration of foodboth dyspnea



COMPARISON STUDY:  8/13/2017



FINDINGS: Lungs are clear. Oblique projections are unremarkable. No focal

infiltrate. No significant cardiac enlargement.



IMPRESSION:  Normal study 













The above report was generated using voice recognition software.  It may contain

grammatical, syntax or spelling errors.







Electronically signed by:  Alexis Nieto M.D.

7/19/2018 5:46 PM



Dictated Date/Time:  7/19/2018 5:46 PM

## 2018-08-10 ENCOUNTER — HOSPITAL ENCOUNTER (EMERGENCY)
Dept: HOSPITAL 45 - C.EDB | Age: 68
Discharge: HOME | End: 2018-08-10
Payer: COMMERCIAL

## 2018-08-10 VITALS
BODY MASS INDEX: 37.46 KG/M2 | BODY MASS INDEX: 37.46 KG/M2 | HEIGHT: 60.98 IN | HEIGHT: 60.98 IN | WEIGHT: 198.42 LBS | WEIGHT: 198.42 LBS

## 2018-08-10 VITALS — SYSTOLIC BLOOD PRESSURE: 178 MMHG | DIASTOLIC BLOOD PRESSURE: 68 MMHG | OXYGEN SATURATION: 98 % | HEART RATE: 66 BPM

## 2018-08-10 VITALS — OXYGEN SATURATION: 96 %

## 2018-08-10 VITALS — TEMPERATURE: 98.42 F

## 2018-08-10 DIAGNOSIS — Z79.82: ICD-10-CM

## 2018-08-10 DIAGNOSIS — R55: Primary | ICD-10-CM

## 2018-08-10 DIAGNOSIS — H53.2: ICD-10-CM

## 2018-08-10 DIAGNOSIS — Z91.041: ICD-10-CM
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
## 2018-08-10 DIAGNOSIS — E03.9: ICD-10-CM

## 2018-08-10 DIAGNOSIS — E11.9: ICD-10-CM

## 2018-08-10 DIAGNOSIS — E78.5: ICD-10-CM

## 2018-08-10 LAB
ALBUMIN SERPL-MCNC: 3.6 GM/DL (ref 3.4–5)
ALP SERPL-CCNC: 67 U/L (ref 45–117)
ALT SERPL-CCNC: 37 U/L (ref 12–78)
AST SERPL-CCNC: 29 U/L (ref 15–37)
BASOPHILS # BLD: 0.01 K/UL (ref 0–0.2)
BASOPHILS NFR BLD: 0.1 %
BUN SERPL-MCNC: 5 MG/DL (ref 7–18)
CALCIUM SERPL-MCNC: 9.2 MG/DL (ref 8.5–10.1)
CO2 SERPL-SCNC: 25 MMOL/L (ref 21–32)
CREAT SERPL-MCNC: 0.89 MG/DL (ref 0.6–1.2)
EOS ABS #: 0.05 K/UL (ref 0–0.5)
EOSINOPHIL NFR BLD AUTO: 187 K/UL (ref 130–400)
GLUCOSE SERPL-MCNC: 88 MG/DL (ref 70–99)
HCT VFR BLD CALC: 36.9 % (ref 37–47)
HGB BLD-MCNC: 12 G/DL (ref 12–16)
IG#: 0.02 K/UL (ref 0–0.02)
IMM GRANULOCYTES NFR BLD AUTO: 31.4 %
INR PPP: 1 (ref 0.9–1.1)
LYMPHOCYTES # BLD: 2.35 K/UL (ref 1.2–3.4)
MCH RBC QN AUTO: 29.8 PG (ref 25–34)
MCHC RBC AUTO-ENTMCNC: 32.5 G/DL (ref 32–36)
MCV RBC AUTO: 91.6 FL (ref 80–100)
MONO ABS #: 0.69 K/UL (ref 0.11–0.59)
MONOCYTES NFR BLD: 9.2 %
NEUT ABS #: 4.37 K/UL (ref 1.4–6.5)
NEUTROPHILS # BLD AUTO: 0.7 %
NEUTROPHILS NFR BLD AUTO: 58.3 %
PHOSPHATE SERPL-MCNC: 2.2 MG/DL (ref 2.5–4.9)
PMV BLD AUTO: 11.8 FL (ref 7.4–10.4)
POTASSIUM SERPL-SCNC: 3.8 MMOL/L (ref 3.5–5.1)
PROT SERPL-MCNC: 6.9 GM/DL (ref 6.4–8.2)
PTT PATIENT: 24.5 SECONDS (ref 21–31)
RED CELL DISTRIBUTION WIDTH CV: 14.7 % (ref 11.5–14.5)
RED CELL DISTRIBUTION WIDTH SD: 49.3 FL (ref 36.4–46.3)
SODIUM SERPL-SCNC: 140 MMOL/L (ref 136–145)
WBC # BLD AUTO: 7.49 K/UL (ref 4.8–10.8)

## 2018-08-10 NOTE — DIAGNOSTIC IMAGING REPORT
R KNEE 3 VIEWS



CLINICAL HISTORY: s/p fall    



COMPARISON: None.



DISCUSSION: The bones and joint spaces appear intact. There is no evidence of

fracture, dislocation or bony disease. There is no evidence for soft tissue

swelling.



IMPRESSION: Negative study.











The above report was generated using voice recognition software.  It may contain

grammatical, syntax or spelling errors.







Electronically signed by:  Alexis Nieto M.D.

8/10/2018 3:35 PM



Dictated Date/Time:  8/10/2018 3:34 PM

## 2018-08-10 NOTE — DIAGNOSTIC IMAGING REPORT
CHEST ONE VIEW PORTABLE



CLINICAL HISTORY: EVALUATE ALTERED MENTAL STATUS/WEAKNESS    



COMPARISON STUDY:  7/19/2018



FINDINGS: The bones soft tissues and hemidiaphragms are normal. The

cardiomediastinal silhouette is normal. The lungs are clear. The pulmonary

vasculature is normal. 



IMPRESSION:  Negative chest. 











The above report was generated using voice recognition software.  It may contain

grammatical, syntax or spelling errors.









Electronically signed by:  Alexis Nieto M.D.

8/10/2018 3:31 PM



Dictated Date/Time:  8/10/2018 3:31 PM

## 2018-08-10 NOTE — DIAGNOSTIC IMAGING REPORT
HEAD CT NONCONTRAST



CT DOSE: 537.48 mGy.cm



HISTORY:      EVALUATE ALTERED MENTAL STATUS/WEAKNESS



TECHNIQUE: Multiaxial CT images of the head were performed without the use of

intravenous contrast. Automated exposure control was utilized for this study.  A

dose lowering technique was utilized adhering to the principles of ALARA.



Comparison: Head CT 8/10/2018.



Findings: Near complete opacification of right sphenoid sinus with an associated

fluid level. The mastoid air cells are clear. The calvarium and skull base are

intact. The ventricles and sulci are within normal limits. There is no mass,

hematoma, midline shift, or acute infarct.



Impression:

No acute intracranial abnormality. Acute right sphenoid sinusitis.







Electronically signed by:  Nilson Seth M.D.

8/10/2018 2:57 PM



Dictated Date/Time:  8/10/2018 2:45 PM

## 2018-08-10 NOTE — EMERGENCY ROOM VISIT NOTE
History


Report prepared by Madina:  Carlos Melgar


Under the Supervision of:  Dr. Blu Vu M.D.


First contact with patient:  13:38


Chief Complaint:  FALL


Stated Complaint:  FALL,RT KNEE,HAND AND SIDE PAIN





History of Present Illness


The patient is a 67 year old  female with a past medical history of 

diabetes mellitus, diplopia, hyperlipidemia, hypothyroidism, renal calculi, 

neuropathy, and RUQ pain and rigidity who presents to the ED with a cc of hand, 

knee, and back pain following a fall on the right side that occurred about an 

hour ago. The patient describes the pain as an achy sensation and does admit 

that she does not remember the incident due to loss of consciousness. She also 

notes that she takes a baby aspirin and blood pressure medications daily, but 

today she also took an Allegra. The patient also notes she has fallen before 

due to her knees giving out. Negative for history of congestive heart failure.





   Source of History:  patient


   Onset:  An hour ago


   Position:  knee (right)


   Quality:  ache


   Associated Symptoms:  + LOC, No abdominal pain





Review of Systems


See HPI for pertinent positives and negatives.  A total of ten systems were 

reviewed and were otherwise negative.





Past Medical & Surgical


Medical Problems:


(1) Diabetes mellitus


(2) Diplopia


(3) Hyperlipidemia


(4) Hypothyroidism


(5) Kidney stone


(6) Neuropathy


(7) RUQ pain


(8) RUQ rigidity


Surgical Problems:


(1) History of back surgery








Family History





Cancer


Diabetes mellitus


Heart disease


Hypertension


Kidney stones





Social History


Smoking Status:  Never Smoker


Alcohol Use:  none


Drug Use:  none


Marital Status:  


Housing Status:  lives alone


Occupation Status:  retired





Current/Historical Medications


Scheduled


Allopurinol (Zyloprim), 100 MG PO QAM


Amiloride Hcl (Amiloride Hcl), 5 MG PO QAM


Aspirin (Aspirin Ec), 81 MG PO DAILY


Atorvastatin (Lipitor), 10 MG PO DAILY


Cyanocobalamin (B-12), 1,000 MCG PO QAM


Gabapentin (Neurontin), 400 MG PO TID


Levothyroxine Sodium (Levothyroxine Sodium), 75 MCG PO QAM


Magnesium Oxide (Mg Supplement (Magnesium), 400 MG PO NOON


Metformin HCl (Metformin HCl), 500 MG PO TID


Paroxetine (Paxil), 20 MG PO QAM


Potassium Citrate (Alkalinizer (Potassium Citrate ER), 2 TAB PO BID





Scheduled PRN


Alprazolam (Xanax), 1 MG PO BID PRN for Anxiety





Allergies


Coded Allergies:  


     Iodinated Diagnostic Agents (Verified  Allergy, Severe, HIVES AND THROAT 

SWELLING, 5/23/18)





Physical Exam


Vital Signs











  Date Time  Temp Pulse Resp B/P (MAP) Pulse Ox O2 Delivery O2 Flow Rate FiO2


 


8/10/18 16:19  66 16 178/68 98   


 


8/10/18 14:15  74 16 154/75 96   





  80  168/88    





  80  175/109    


 


8/10/18 14:15     96 Room Air  


 


8/10/18 13:20 36.9 88 18 181/77 96 Room Air  











Physical Exam


GENERAL: Awake, alert, well-appearing, NAD, obese, wearing glasses


HENT: Normocephalic, atraumatic.


EYES: Normal conjunctiva. Sclera non-icteric. PERRL. No anisocoria.


NECK: Supple. No nuchal rigidity. FROM.


RESPIRATORY: CTAB, no rhonchi, wheezing, crackles


CARDIAC: RRR, no MRG


ABDOMEN: Soft, NTND, BS+


MSK: No chest wall TTP, no LE edema, pain at MCP on lateral right hand, NVI to 

MUR nerves, mild distal right thigh and knee pain, no effusion or erythema. No 

midline cervical spine TTP, no pain to chest, back, abdomen, LUE, or LLE. 


NEURO: GCS 15, CN 2-12 intact, moves all 4s on command


SKIN: No rash or jaundice noted.





Medical Decision & Procedures


ER Provider


Diagnostic Interpretation:


Radiology results as stated below per my review and radiologist interpretation: 





R HAND MIN 3 VIEWS ROUTINE





CLINICAL HISTORY: s/p fall, bruising lateral hand at MCP trauma. Pain.





COMPARISON: None.





DISCUSSION: The bones and joint spaces appear intact. There is no evidence of


fracture, dislocation or bony disease. Mild soft tissue edema. Minimal


degenerative change.





IMPRESSION: No acute bony abnormality. Mild soft tissue edema.

















The above report was generated using voice recognition software.  It may contain


grammatical, syntax or spelling errors.











Electronically signed by:  Aelxis Nieto M.D.


8/10/2018 3:32 PM





Dictated Date/Time:  8/10/2018 3:31 PM








R KNEE 3 VIEWS





CLINICAL HISTORY: s/p fall    





COMPARISON: None.





DISCUSSION: The bones and joint spaces appear intact. There is no evidence of


fracture, dislocation or bony disease. There is no evidence for soft tissue


swelling.





IMPRESSION: Negative study.

















The above report was generated using voice recognition software.  It may contain


grammatical, syntax or spelling errors.











Electronically signed by:  Alexis Nieto M.D.


8/10/2018 3:35 PM





Dictated Date/Time:  8/10/2018 3:34 PM








CHEST ONE VIEW PORTABLE





CLINICAL HISTORY: EVALUATE ALTERED MENTAL STATUS/WEAKNESS    





COMPARISON STUDY:  7/19/2018





FINDINGS: The bones soft tissues and hemidiaphragms are normal. The


cardiomediastinal silhouette is normal. The lungs are clear. The pulmonary


vasculature is normal. 





IMPRESSION:  Negative chest. 

















The above report was generated using voice recognition software.  It may contain


grammatical, syntax or spelling errors.














Electronically signed by:  Alexis Nieto M.D.


8/10/2018 3:31 PM





Dictated Date/Time:  8/10/2018 3:31 PM








HEAD CT NONCONTRAST





CT DOSE: 537.48 mGy.cm





HISTORY:      EVALUATE ALTERED MENTAL STATUS/WEAKNESS





TECHNIQUE: Multiaxial CT images of the head were performed without the use of


intravenous contrast. Automated exposure control was utilized for this study.  A


dose lowering technique was utilized adhering to the principles of ALARA.





Comparison: Head CT 8/10/2018.





Findings: Near complete opacification of right sphenoid sinus with an associated


fluid level. The mastoid air cells are clear. The calvarium and skull base are


intact. The ventricles and sulci are within normal limits. There is no mass,


hematoma, midline shift, or acute infarct.





Impression:


No acute intracranial abnormality. Acute right sphenoid sinusitis.











Electronically signed by:  Nilson Seth M.D.


8/10/2018 2:57 PM





Dictated Date/Time:  8/10/2018 2:45 PM





Laboratory Results


8/10/18 14:15








Red Blood Count 4.03, Mean Corpuscular Volume 91.6, Mean Corpuscular Hemoglobin 

29.8, Mean Corpuscular Hemoglobin Concent 32.5, Mean Platelet Volume 11.8, 

Neutrophils (%) (Auto) 58.3, Lymphocytes (%) (Auto) 31.4, Monocytes (%) (Auto) 

9.2, Eosinophils (%) (Auto) 0.7, Basophils (%) (Auto) 0.1, Neutrophils # (Auto) 

4.37, Lymphocytes # (Auto) 2.35, Monocytes # (Auto) 0.69, Eosinophils # (Auto) 

0.05, Basophils # (Auto) 0.01





8/10/18 14:15

















Test


  8/10/18


14:15 8/10/18


14:20


 


White Blood Count


  7.49 K/uL


(4.8-10.8) 


 


 


Red Blood Count


  4.03 M/uL


(4.2-5.4) 


 


 


Hemoglobin


  12.0 g/dL


(12.0-16.0) 


 


 


Hematocrit 36.9 % (37-47)  


 


Mean Corpuscular Volume


  91.6 fL


() 


 


 


Mean Corpuscular Hemoglobin


  29.8 pg


(25-34) 


 


 


Mean Corpuscular Hemoglobin


Concent 32.5 g/dl


(32-36) 


 


 


Platelet Count


  187 K/uL


(130-400) 


 


 


Mean Platelet Volume


  11.8 fL


(7.4-10.4) 


 


 


Neutrophils (%) (Auto) 58.3 %  


 


Lymphocytes (%) (Auto) 31.4 %  


 


Monocytes (%) (Auto) 9.2 %  


 


Eosinophils (%) (Auto) 0.7 %  


 


Basophils (%) (Auto) 0.1 %  


 


Neutrophils # (Auto)


  4.37 K/uL


(1.4-6.5) 


 


 


Lymphocytes # (Auto)


  2.35 K/uL


(1.2-3.4) 


 


 


Monocytes # (Auto)


  0.69 K/uL


(0.11-0.59) 


 


 


Eosinophils # (Auto)


  0.05 K/uL


(0-0.5) 


 


 


Basophils # (Auto)


  0.01 K/uL


(0-0.2) 


 


 


RDW Standard Deviation


  49.3 fL


(36.4-46.3) 


 


 


RDW Coefficient of Variation


  14.7 %


(11.5-14.5) 


 


 


Immature Granulocyte % (Auto) 0.3 %  


 


Immature Granulocyte # (Auto)


  0.02 K/uL


(0.00-0.02) 


 


 


Prothrombin Time


  10.4 SECONDS


(9.0-12.0) 


 


 


Prothromb Time International


Ratio 1.0 (0.9-1.1) 


  


 


 


Activated Partial


Thromboplast Time 24.5 SECONDS


(21.0-31.0) 


 


 


Partial Thromboplastin Ratio 0.9  


 


Anion Gap


  10.0 mmol/L


(3-11) 


 


 


Est Creatinine Clear Calc


Drug Dose 61.8 ml/min 


  


 


 


Estimated GFR (


American) 77.2 


  


 


 


Estimated GFR (Non-


American 66.6 


  


 


 


BUN/Creatinine Ratio 5.9 (10-20)  


 


Calcium Level


  9.2 mg/dl


(8.5-10.1) 


 


 


Phosphorus Level


  2.2 mg/dl


(2.5-4.9) 


 


 


Magnesium Level


  2.1 mg/dl


(1.8-2.4) 


 


 


Total Bilirubin


  0.6 mg/dl


(0.2-1) 


 


 


Direct Bilirubin


  0.1 mg/dl


(0-0.2) 


 


 


Aspartate Amino Transf


(AST/SGOT) 29 U/L (15-37) 


  


 


 


Alanine Aminotransferase


(ALT/SGPT) 37 U/L (12-78) 


  


 


 


Alkaline Phosphatase


  67 U/L


() 


 


 


Troponin I


  < 0.015 ng/ml


(0-0.045) 


 


 


Total Protein


  6.9 gm/dl


(6.4-8.2) 


 


 


Albumin


  3.6 gm/dl


(3.4-5.0) 


 


 


Thyroid Stimulating Hormone


(TSH) 0.939 uIu/ml


(0.300-4.500) 


 


 


Urine Color  YELLOW 


 


Urine Appearance  CLEAR (CLEAR) 


 


Urine pH


  


  >= 9.0


(4.5-7.5)


 


Urine Specific Gravity


  


  1.015


(1.000-1.030)


 


Urine Protein  NEG (NEG) 


 


Urine Glucose (UA)  1+ (NEG) 


 


Urine Ketones  NEG (NEG) 


 


Urine Occult Blood  NEG (NEG) 


 


Urine Nitrite  NEG (NEG) 


 


Urine Bilirubin  NEG (NEG) 


 


Urine Urobilinogen  NEG (NEG) 


 


Urine Leukocyte Esterase  TRACE (NEG) 


 


Urine WBC (Auto)


  


  10-30 /hpf


(0-5)


 


Urine RBC (Auto)  0-4 /hpf (0-4) 


 


Urine Hyaline Casts (Auto)  1-5 /lpf (0-5) 


 


Urine Epithelial Cells (Auto)


  


  5-10 /lpf


(0-5)


 


Urine Bacteria (Auto)  NEG (NEG) 





Laboratory results reviewed by me





Medications Administered











 Medications


  (Trade)  Dose


 Ordered  Sig/Khang


 Route  Start Time


 Stop Time Status Last Admin


Dose Admin


 


 Sodium Chloride  500 ml @ 


 999 mls/hr  Q31M STAT


 IV  8/10/18 13:54


 8/10/18 14:24 DC 8/10/18 13:54


999 MLS/HR











ECG Per My Interpretation


Indication:  syncope


Rate (beats per minute):  69


Rhythm:  normal sinus


Findings:  other (Normal axis, normal intervals, has TWI in lead aVL)





ED Course


1345: The patient was evaluated in room B9. A complete history and physical 

exam was performed.





1550: I reevaluated the patient 





1620: I reevaluated the patient. Discussed results and discharge instructions: 

She verbalized understanding and agreement. The patient is ready for discharge.





Medical Decision


Nursing notes reviewed. Ancillary studies and prior records reviewed. 








Differential diagnosis:


Etiologies such as vasovagal event, infection, hypoglycemia, electrolyte 

abnormalities, cardiac sources, intracerebral event, toxicologic, neurologic, 

as well as others were entertained.





Patient was seen and evaluated the bedside.  Patient states that she had a fall 

onto her right side about an hour prior.  Patient does not know how long but 

believes it was brief in nature.  Patient denies any tongue biting or bowel or 

bladder incontinence.  Patient denies any chest pain, shortness of breath, 

headache.  The patient has a nonfocal neurologic exam.  Patient may have a 

questionable deformity to the right hand.  The patient also does complain of 

some mild knee pain.  Patient denies any history of CHF.  Patient also denies 

any sort of prodrome, palpitations, lightheadedness, or skipped heartbeats 

prior to her episode.





Patient did have blood work completed, CT brain, EKG.





Patient's blood work unremarkable.  CT brain negative acute.  She was noted to 

have incidental sinusitis.  The patient has not complained of any facial pain 

or discharge.  Will not treat at this time.  EKG does not show any ischemic 

change or overt arrhythmia.  Troponin is not elevated.  Patient's plain films 

are negative.  Patient is already had a recent tetanus it will not be updated.  

I did discuss whether not the patient wanted to stay versus close outpatient 

follow-up given that the patient is very well-appearing and her blood work EKG 

and imaging are reassuring.  Patient would like to go home I believe this is 

reasonable at this time.  The patient does not show any signs of volume 

overload unstable vital signs or other issues at this time.  Patient was told 

return if she has any worsening symptoms.  Family is at the bedside state that 

they will ensure that she keeps her follow-up appointment to return if anything 

changes.  Patient was given strict follow-up, discharge, and return 

precautions.  All questions were answered.  Patient was deemed suitable for 

outpatient follow-up at this time.  Patient agreed with the plan of care and 

was safely discharged home.





Medication Reconcilliation


Current Medication List:  was personally reviewed by me





Blood Pressure Screening


Patient's blood pressure:  Elevated blood pressure


Blood pressure disposition:  Elevated BP felt to be situational





Impression





 Primary Impression:  


 Syncope


 Additional Impressions:  


 Fall


 Contusion of multiple sites





Scribe Attestation


The scribe's documentation has been prepared under my direction and personally 

reviewed by me in its entirety. I confirm that the note above accurately 

reflects all work, treatment, procedures, and medical decision making performed 

by me.





Departure Information


Dispostion


Home / Self-Care





Referrals


Asaf Muse M.D. (PCP)





Forms


HOME CARE DOCUMENTATION FORM,                                                 

               IMPORTANT VISIT INFORMATION





Patient Instructions


ED RICE, ED Wound Care, Fainting (Syncope) - [facility], My [facility]





Additional Instructions





Please return to the emergency department if you have worsening or recurrent 

symptoms not amenable to at-home treatment.  Please call for a follow-up 

appointment with her primary care physician.  Please take your medications as 

prescribed.  If you have other concerns and/or complaints please feel free to 

also call your primary care physician's office or return the ED for further 

evaluation, management, and treatment.





You may take tylenol 650 mg every 6 hours as needed for pain/fever unless told 

by your physician to not take it or have liver problems.





Take your medications as prescribed. 





You have been examined and treated today on an emergency basis only. This is 

not a substitute for, or an effort to provide, complete comprehensive medical 

care. It is impossible to recognize and treat all injuries or illnesses in a 

single emergency department visit. It is therefore important that you follow up 

closely with [facility], your PCP, and/or your specialist(s). 

Call as soon as possible for an appointment.





Thank you for your time and consideration. I look forward to speaking with you 

again soon. Please don't hesitate to call us if you have any questions.





Problem Qualifiers








 Primary Impression:  


 Syncope


 Syncope type:  unspecified  Qualified Codes:  R55 - Syncope and collapse


 Additional Impressions:  


 Fall


 Encounter type:  initial encounter  Qualified Codes:  W19.XXXA - Unspecified 

fall, initial encounter

## 2018-08-10 NOTE — DIAGNOSTIC IMAGING REPORT
R HAND MIN 3 VIEWS ROUTINE



CLINICAL HISTORY: s/p fall, bruising lateral hand at MCP trauma. Pain.



COMPARISON: None.



DISCUSSION: The bones and joint spaces appear intact. There is no evidence of

fracture, dislocation or bony disease. Mild soft tissue edema. Minimal

degenerative change.



IMPRESSION: No acute bony abnormality. Mild soft tissue edema.











The above report was generated using voice recognition software.  It may contain

grammatical, syntax or spelling errors.







Electronically signed by:  Alexis Nieto M.D.

8/10/2018 3:32 PM



Dictated Date/Time:  8/10/2018 3:31 PM